# Patient Record
Sex: FEMALE | Employment: UNEMPLOYED | URBAN - METROPOLITAN AREA
[De-identification: names, ages, dates, MRNs, and addresses within clinical notes are randomized per-mention and may not be internally consistent; named-entity substitution may affect disease eponyms.]

---

## 2017-01-20 ENCOUNTER — OFFICE VISIT (OUTPATIENT)
Dept: ORTHOPEDIC SURGERY | Age: 67
End: 2017-01-20

## 2017-01-20 VITALS
SYSTOLIC BLOOD PRESSURE: 170 MMHG | DIASTOLIC BLOOD PRESSURE: 72 MMHG | WEIGHT: 193 LBS | HEIGHT: 62 IN | OXYGEN SATURATION: 96 % | BODY MASS INDEX: 35.51 KG/M2 | RESPIRATION RATE: 18 BRPM | HEART RATE: 73 BPM | TEMPERATURE: 98.1 F

## 2017-01-20 DIAGNOSIS — M48.061 SPINAL STENOSIS OF LUMBAR REGION AT MULTIPLE LEVELS: ICD-10-CM

## 2017-01-20 DIAGNOSIS — Z98.1 S/P LUMBAR FUSION: Primary | ICD-10-CM

## 2017-01-20 RX ORDER — HYDROCODONE BITARTRATE AND ACETAMINOPHEN 7.5; 325 MG/1; MG/1
1 TABLET ORAL
Qty: 90 TAB | Refills: 0 | Status: SHIPPED | OUTPATIENT
Start: 2017-01-20 | End: 2017-04-21 | Stop reason: SDUPTHER

## 2017-01-20 RX ORDER — GABAPENTIN 300 MG/1
600 CAPSULE ORAL
Qty: 60 CAP | Refills: 3 | Status: SHIPPED | OUTPATIENT
Start: 2017-01-20 | End: 2017-04-21 | Stop reason: SDUPTHER

## 2017-01-20 NOTE — MR AVS SNAPSHOT
Visit Information Date & Time Provider Department Dept. Phone Encounter #  
 1/20/2017 10:15 AM Gabby Benitez MD [facility], [address] [facility] [phone] [id number] Follow-up Instructions Return in about 3 months (around 4/20/2017). Follow-up and Disposition History Upcoming Health Maintenance Date Due Hepatitis C Screening 1950 DTaP/Tdap/Td series (1 - Tdap) 8/13/1970 BREAST CANCER SCRN MAMMOGRAM 8/13/1999 FOBT Q 1 YEAR AGE 50-75 8/13/1999 ZOSTER VACCINE AGE 60> 8/13/2009 GLAUCOMA SCREENING Q2Y 8/13/2014 OSTEOPOROSIS SCREENING (DEXA) 8/13/2014 Pneumococcal 65+ Low/Medium Risk (1 of 2 - PCV13) 8/13/2014 MEDICARE YEARLY EXAM 8/13/2014 INFLUENZA AGE 9 TO ADULT 8/1/2016 Allergies as of 1/20/2017  Review Complete On: 1/20/2017 By: Gabby Benitez MD  
  
 Severity Noted Reaction Type Reactions Cortisporin [Neomycin-bacitracin-poly-hc]  01/21/2016    Other (comments)  
 tininitus Morphine  06/16/2016    Other (comments) Neomycin-polymyxin-hc  06/16/2016    Other (comments) Oxycodone-acetaminophen  10/21/2016    Itching Oxycodone-aspirin  06/16/2016    Other (comments) Current Immunizations  Never Reviewed No immunizations on file. Not reviewed this visit You Were Diagnosed With   
  
 Codes Comments S/P lumbar fusion    -  Primary ICD-10-CM: Z98.1 ICD-9-CM: V45.4 Spinal stenosis of lumbar region at multiple levels     ICD-10-CM: M48.06 
ICD-9-CM: 724.02 Vitals BP Pulse Temp Resp Height(growth percentile) Weight(growth percentile) 170/72 73 98.1 °F (36.7 °C) (Oral) 18 5' 2\" (1.575 m) 193 lb (87.5 kg) SpO2 BMI OB Status Smoking Status 96% 35.3 kg/m2 Hysterectomy Former Smoker BMI and BSA Data Body Mass Index Body Surface Area  
 35.3 kg/m 2 1.96 m 2 Preferred Pharmacy Pharmacy Name Phone Lakesha SMITH [facility], [address] [phone] Your Updated Medication List  
  
   
This list is accurate as of: 1/20/17 11:45 AM.  Always use your most recent med list.  
  
  
  
  
 acetaminophen 500 mg tablet Commonly known as:  TYLENOL Take 1,000 mg by mouth three (3) times daily. albuterol 90 mcg/actuation inhaler Commonly known as:  PROVENTIL HFA, VENTOLIN HFA, PROAIR HFA Take 2 Puffs by inhalation every four (4) hours as needed for Wheezing. ALPRAZolam 0.25 mg tablet Commonly known as:  Juvencio Tatum Take 0.5 mg by mouth three (3) times daily (with meals). aspirin delayed-release 81 mg tablet Take  by mouth daily. atorvastatin 40 mg tablet Commonly known as:  LIPITOR Take  by mouth daily. beclomethasone 80 mcg/actuation inhaler Commonly known as:  QVAR Take 1 Puff by inhalation two (2) times a day. CALCIUM 600 WITH VITAMIN D3 PO Take  by mouth three (3) times daily. DULoxetine 30 mg capsule Commonly known as:  CYMBALTA Start with one tablet at bedtime for 1 week Then increase to 2 tablets at bedtime  Indications: CHRONIC MUSCULOSKELETAL PAIN  
  
 esomeprazole 40 mg capsule Commonly known as:  Savanna Bergamo Take 40 mg by mouth daily. gabapentin 300 mg capsule Commonly known as:  NEURONTIN Take 2 Caps by mouth nightly. HYDROcodone-acetaminophen 7.5-325 mg per tablet Commonly known as:  Leidy Fothergill Take 1 Tab by mouth every eight (8) hours as needed for Pain. Max Daily Amount: 3 Tabs. lisinopril-hydroCHLOROthiazide 10-12.5 mg per tablet Commonly known as:  Jed Chalino Take  by mouth daily. meloxicam 15 mg tablet Commonly known as:  MOBIC Take 15 mg by mouth daily. metoprolol tartrate 25 mg tablet Commonly known as:  LOPRESSOR Take 25 mg by mouth two (2) times a day. nitroglycerin 0.4 mg SL tablet Commonly known as:  NITROSTAT by SubLINGual route every five (5) minutes as needed for Chest Pain. omeprazole 20 mg capsule Commonly known as:  PRILOSEC Take 20 mg by mouth daily. oxyCODONE-acetaminophen  mg per tablet Commonly known as:  PERCOCET Take 1 Tab by mouth every six (6) hours as needed for Pain. Max Daily Amount: 4 Tabs. tiotropium 18 mcg inhalation capsule Commonly known as:  Ruffus Cypher Take 1 Cap by inhalation daily. traMADol 50 mg tablet Commonly known as:  ULTRAM  
Take 50 mg by mouth four (4) times daily. Prescriptions Printed Refills HYDROcodone-acetaminophen (NORCO) 7.5-325 mg per tablet 0 Sig: Take 1 Tab by mouth every eight (8) hours as needed for Pain. Max Daily Amount: 3 Tabs. Class: Print Route: Oral  
  
Prescriptions Sent to Pharmacy Refills  
 gabapentin (NEURONTIN) 300 mg capsule 3 Sig: Take 2 Caps by mouth nightly. Class: Normal  
 Pharmacy: [facility]  #: [phone] Route: Oral  
  
We Performed the Following AMB POC XRAY, SPINE, LUMBOSACRAL; 2 O [35373 CPT(R)] Follow-up Instructions Return in about 3 months (around 4/20/2017). Please provide this summary of care documentation to your next provider. Your primary care clinician is listed as Jessica Bauman. If you have any questions after today's visit, please call [phone].

## 2017-01-20 NOTE — PROGRESS NOTES
Uraih Carvalho [facility] [address].  [address], [address]  [place]  Phone: [phone]  Fax: [phone]  PROGRESS NOTE  Patient: Isaías Gómez                MRN: [id number]       SSN: [id number]  YOB: 1950        AGE: 78 y.o. SEX: female  Body mass index is 35.3 kg/(m^2). PCP: Dallas Smith MD  01/20/17    Chief Complaint   Patient presents with    Back Pain     3 month follow up       [facility], RADIOGRAPHS, and PLAN:     HISTORY:  Ms. Jason Pardo returns today. She has made a small amount of progress since she got her bone growth stimulator six weeks ago. Her x-rays appear to be stable. I do not see any haloing today around her instrumentation. She is neurologically intact. She is stable to do activities and feels like she has made a small amount of progress and feels she has improved since surgery, but is not dramatic about the improvements. I see no focal neurology. At this point, I will see her back in eight weeks to monitor her progress. I do not know what else to do for her. Her GI symptoms appear to have subsided. cc: Danny Hernandez M.D. Past Medical History   Diagnosis Date    [facility]     Chronic obstructive pulmonary disease (HCC)     GERD (gastroesophageal reflux disease)     Hypertension     Thyroid condition      enlarged    Tumors      fatty tissue       Family History   Problem Relation Age of Onset    No Known Problems Mother     No Known Problems Father        Current Outpatient Prescriptions   Medication Sig Dispense Refill    esomeprazole (NEXIUM) 40 mg capsule Take 40 mg by mouth daily.  gabapentin (NEURONTIN) 300 mg capsule Start one tablet qhs x 1 week, then may increase to 2 tabs qhs      atorvastatin (LIPITOR) 40 mg tablet Take  by mouth daily.  metoprolol tartrate (LOPRESSOR) 25 mg tablet Take 25 mg by mouth two (2) times a day.       DULoxetine (CYMBALTA) 30 mg capsule Start with one tablet at bedtime for 1 week Then increase to 2 tablets at bedtime  Indications: CHRONIC MUSCULOSKELETAL PAIN 60 Cap 2    meloxicam (MOBIC) 15 mg tablet Take 15 mg by mouth daily.  albuterol (PROVENTIL HFA, VENTOLIN HFA, PROAIR HFA) 90 mcg/actuation inhaler Take 2 Puffs by inhalation every four (4) hours as needed for Wheezing.  beclomethasone (QVAR) 80 mcg/actuation inhaler Take 1 Puff by inhalation two (2) times a day.  nitroglycerin (NITROSTAT) 0.4 mg SL tablet by SubLINGual route every five (5) minutes as needed for Chest Pain.  lisinopril-hydrochlorothiazide (PRINZIDE, ZESTORETIC) 10-12.5 mg per tablet Take  by mouth daily.  ALPRAZolam (XANAX) 0.25 mg tablet Take 0.5 mg by mouth three (3) times daily (with meals).  aspirin delayed-release 81 mg tablet Take  by mouth daily.  CALCIUM CARBONATE/VITAMIN D3 (CALCIUM 600 WITH VITAMIN D3 PO) Take  by mouth three (3) times daily.  acetaminophen (TYLENOL) 500 mg tablet Take 1,000 mg by mouth three (3) times daily.  HYDROcodone-acetaminophen (NORCO) 7.5-325 mg per tablet Take 1 Tab by mouth every eight (8) hours as needed for Pain. Max Daily Amount: 3 Tabs. 90 Tab 0    oxyCODONE-acetaminophen (PERCOCET)  mg per tablet Take 1 Tab by mouth every six (6) hours as needed for Pain. Max Daily Amount: 4 Tabs. 50 Tab 0    tiotropium (SPIRIVA) 18 mcg inhalation capsule Take 1 Cap by inhalation daily.  omeprazole (PRILOSEC) 20 mg capsule Take 20 mg by mouth daily.  traMADol (ULTRAM) 50 mg tablet Take 50 mg by mouth four (4) times daily.          Allergies   Allergen Reactions    Cortisporin [Neomycin-Bacitracin-Poly-Hc] Other (comments)     tininitus    Morphine Other (comments)    Neomycin-Polymyxin-Hc Other (comments)    Oxycodone-Acetaminophen Itching    Oxycodone-Aspirin Other (comments)       Past Surgical History   Procedure Laterality Date    Hx hernia repair hiatal hernia    Hx appendectomy      Hx hysterectomy      Hx breast reduction      Hx mohs procedure Right     Hx tonsil and adenoidectomy      Pr plastic surgery, neck  1990     ACDF by Dr. Mariah Wood Hx cervical fusion  1990     ACDF    Hx cholecystectomy      Hx heent         Past Medical History   Diagnosis Date    [facility]     Chronic obstructive pulmonary disease (HCC)     GERD (gastroesophageal reflux disease)     Hypertension     Thyroid condition      enlarged    Tumors      fatty tissue       Social History     Social History    Marital status:      Spouse name: N/A    Number of children: N/A    Years of education: N/A     Occupational History    retired      Social History Main Topics    Smoking status: Former Smoker    Smokeless tobacco: Never Used      Comment: quit smoking 2000    Alcohol use No    Drug use: No    Sexual activity: Not on file     Other Topics Concern    Not on file     Social History Narrative       REVIEW OF SYSTEMS:   CONSTITUTIONAL SYMPTOMS:  Negative. EYES:  Negative. EARS, NOSE, THROAT AND MOUTH:  Negative. CARDIOVASCULAR:  Negative. RESPIRATORY:  Negative. GENITOURINARY: Negative. GASTROINTESTINAL:  Negative. INTEGUMENTARY (SKIN AND/OR BREAST):  Negative. MUSCULOSKELETAL: Per HPI.   ENDOCRINE/RHEUMATOLOGIC:  Negative. NEUROLOGICAL:  Per HPI. HEMATOLOGIC/LYMPHATIC:  Negative. ALLERGIC/IMMUNOLOGIC:  Negative. PSYCHIATRIC:  Negative. PHYSICAL EXAMINATION:   Visit Vitals    /72    Pulse 73    Temp 98.1 °F (36.7 °C) (Oral)    Resp 18    Ht 5' 2\" (1.575 m)    Wt 193 lb (87.5 kg)    SpO2 96%    BMI 35.3 kg/m2    PAIN SCALE: 7/10    CONSTITUTIONAL: The patient is in no apparent distress and is alert and oriented x 3. HEENT: Normocephalic. Hearing grossly intact. NECK: Supple and symmetric. no tenderness, or masses were felt. RESPIRATORY: No labored breathing.   CARDIOVASCULAR: The carotid pulses were normal. Peripheral pulses were 2+. CHEST: Normal AP diameter and normal contour without any kyphoscoliosis. LYMPHATIC: No lymphadenopathy was appreciated in the neck, axillae or groin. SKIN:  Negative for scars, rashes, lesions, or ulcers on the right upper, right lower, left upper, left lower and trunk. NEUROLOGICAL: Alert and oriented x 3. Ambulation with crutches. FWB. EXTREMITIES: See musculoskeletal.  MUSCULOSKELETAL:   Head and Neck:  Negative for misalignment, asymmetry, crepitation, defects, tenderness masses or effusions.  Left Upper Extremity: Inspection, percussion and palpation preformed. Brumfields sign is negative.  Right Upper Extremity: Inspection, percussion and palpation preformed. Brumfields sign is negative.  Spine, Ribs and Pelvis: Back pain, L>R. Short standing and walking tolerance. Inspection, percussion and palpation preformed. Negative for misalignment, asymmetry, crepitation, defects, tenderness masses or effusions.  Right Lower Extremity: Inspection, percussion and palpation preformed. Negative straight leg raise.  Left Lower Extremity: Inspection, percussion and palpation preformed. Negative straight leg raise. SPINE EXAM:     Lumbar spine: No rash, ecchymosis, or gross obliquity. No focal atrophy is noted. ASSESSMENT    ICD-10-CM ICD-9-CM    1.  S/P lumbar fusion Z98.1 V45.4 AMB POC XRAY, SPINE, LUMBOSACRAL; 2 O       Written by Vera Dillon, as dictated by Amari Lepe MD.

## 2017-04-21 ENCOUNTER — TELEPHONE (OUTPATIENT)
Dept: ORTHOPEDIC SURGERY | Age: 67
End: 2017-04-21

## 2017-04-21 ENCOUNTER — OFFICE VISIT (OUTPATIENT)
Dept: ORTHOPEDIC SURGERY | Age: 67
End: 2017-04-21

## 2017-04-21 VITALS
SYSTOLIC BLOOD PRESSURE: 161 MMHG | BODY MASS INDEX: 34.23 KG/M2 | TEMPERATURE: 97.8 F | HEART RATE: 92 BPM | OXYGEN SATURATION: 96 % | DIASTOLIC BLOOD PRESSURE: 96 MMHG | HEIGHT: 62 IN | RESPIRATION RATE: 18 BRPM | WEIGHT: 186 LBS

## 2017-04-21 DIAGNOSIS — M48.061 LUMBAR SPINAL STENOSIS: Primary | ICD-10-CM

## 2017-04-21 DIAGNOSIS — M48.061 SPINAL STENOSIS OF LUMBAR REGION AT MULTIPLE LEVELS: ICD-10-CM

## 2017-04-21 DIAGNOSIS — Z98.1 S/P LUMBAR FUSION: ICD-10-CM

## 2017-04-21 DIAGNOSIS — M54.2 NECK PAIN: ICD-10-CM

## 2017-04-21 RX ORDER — GABAPENTIN 300 MG/1
300 CAPSULE ORAL 3 TIMES DAILY
Qty: 90 CAP | Refills: 3 | Status: SHIPPED | OUTPATIENT
Start: 2017-04-21

## 2017-04-21 RX ORDER — METHIMAZOLE 5 MG/1
5 TABLET ORAL DAILY
COMMUNITY
Start: 2017-03-22

## 2017-04-21 RX ORDER — HYDROCODONE BITARTRATE AND ACETAMINOPHEN 7.5; 325 MG/1; MG/1
1 TABLET ORAL
Qty: 90 TAB | Refills: 0 | Status: SHIPPED | OUTPATIENT
Start: 2017-04-21

## 2017-04-21 RX ORDER — NAPROXEN 500 MG/1
500 TABLET ORAL 2 TIMES DAILY WITH MEALS
Qty: 60 TAB | Refills: 3 | Status: SHIPPED | OUTPATIENT
Start: 2017-04-21

## 2017-04-21 NOTE — PROGRESS NOTES
Uriah Carvalho [facility] [address].  [address], [address]  [place]  Phone: [phone]  Fax: [phone]  PROGRESS NOTE  Patient: Billie Salas                MRN: [id number]       SSN: [id number]  YOB: 1950        AGE: 78 y.o. SEX: female  Body mass index is 34.02 kg/(m^2). PCP: Jazmin Soto MD  04/21/17    Chief Complaint   Patient presents with    Back Pain     3 month follow up       [facility], RADIOGRAPHS, and PLAN:     HISTORY:  Ms. Koko Juarez returns today. She is having worsening back pain and a new complaint of neck pain with occipital cervical headache. It is mainly a right-side occipital cervical headache. She denies radiculopathy. Probably 20 years ago or nearly so, she had a multilevel cervical fusion. X-rays of her back demonstrate a stable probable pseudarthrosis. She is using a bone growth stimulator. Her pain is worsening and she uses crutches to ambulate. She refuses to consider any type of additional surgical intervention. I sense with a limited fusion we would have to do some further procedure. What that would be is difficult with her known terrible bone stock and smoking history. It would probably be something along the order of an XLIF with revision instrumentation. RADIOGRAPHS:  With regards to her neck, I reviewed x-rays. She has C2 and C3 listhesis with very arthritic facets with a pan cervical fusion below. ASSESSMENT/PLAN:  I am going to get a new MRI of her cervical spine to see if there is any true cord compression or just dealing with local instability. We are going to place her on an enhanced Neurontin dosage at night, as well as nonsteroidal anti-inflammatories to see if this can assist with better pain control. I will see her back in followup.       cc: Jazmin Soto MD         Past Medical History:   Diagnosis Date    [facility] [facility]     Chronic obstructive pulmonary disease ([facility] [facility]     GERD (gastroesophageal reflux disease)     Hypertension     Thyroid condition     enlarged    Tumors     fatty tissue       Family History   Problem Relation Age of Onset    No Known Problems Mother     No Known Problems Father        Current Outpatient Prescriptions   Medication Sig Dispense Refill    methIMAzole (TAPAZOLE) 5 mg tablet Take 5 mg by mouth daily.  HYDROcodone-acetaminophen (NORCO) 7.5-325 mg per tablet Take 1 Tab by mouth every eight (8) hours as needed for Pain. Max Daily Amount: 3 Tabs. 90 Tab 0    gabapentin (NEURONTIN) 300 mg capsule Take 1 Cap by mouth three (3) times daily. 90 Cap 3    naproxen (NAPROSYN) 500 mg tablet Take 1 Tab by mouth two (2) times daily (with meals). 60 Tab 3    atorvastatin (LIPITOR) 40 mg tablet Take 20 mg by mouth daily.  metoprolol tartrate (LOPRESSOR) 25 mg tablet Take 25 mg by mouth two (2) times a day.  meloxicam (MOBIC) 15 mg tablet Take 15 mg by mouth daily.  albuterol (PROVENTIL HFA, VENTOLIN HFA, PROAIR HFA) 90 mcg/actuation inhaler Take 2 Puffs by inhalation every four (4) hours as needed for Wheezing.  tiotropium (SPIRIVA) 18 mcg inhalation capsule Take 1 Cap by inhalation daily.  beclomethasone (QVAR) 80 mcg/actuation inhaler Take 1 Puff by inhalation two (2) times a day.  nitroglycerin (NITROSTAT) 0.4 mg SL tablet by SubLINGual route every five (5) minutes as needed for Chest Pain.  lisinopril-hydrochlorothiazide (PRINZIDE, ZESTORETIC) 10-12.5 mg per tablet Take  by mouth daily.  omeprazole (PRILOSEC) 20 mg capsule Take 40 mg by mouth daily.  ALPRAZolam (XANAX) 0.25 mg tablet Take 0.5 mg by mouth three (3) times daily (with meals).  traMADol (ULTRAM) 50 mg tablet Take 50 mg by mouth three (3) times daily.  aspirin delayed-release 81 mg tablet Take  by mouth daily.  CALCIUM CARBONATE/VITAMIN D3 (CALCIUM 600 WITH VITAMIN D3 PO) Take  by mouth three (3) times daily.  acetaminophen (TYLENOL) 500 mg tablet Take 1,000 mg by mouth three (3) times daily.  esomeprazole (NEXIUM) 40 mg capsule Take 40 mg by mouth daily.  oxyCODONE-acetaminophen (PERCOCET)  mg per tablet Take 1 Tab by mouth every six (6) hours as needed for Pain. Max Daily Amount: 4 Tabs. 50 Tab 0    DULoxetine (CYMBALTA) 30 mg capsule Start with one tablet at bedtime for 1 week Then increase to 2 tablets at bedtime  Indications: CHRONIC MUSCULOSKELETAL PAIN 60 Cap 2       Allergies   Allergen Reactions    Cortisporin [Neomycin-Bacitracin-Poly-Hc] Other (comments)     tininitus    Morphine Other (comments)    Neomycin-Polymyxin-Hc Other (comments)    Oxycodone-Acetaminophen Itching    Oxycodone-Aspirin Other (comments)       Past Surgical History:   Procedure Laterality Date    HX APPENDECTOMY      HX BACK SURGERY  06/01/2016    L4/5 Lami Fusion    HX BREAST REDUCTION      HX CERVICAL FUSION  1990    ACDF    HX CHOLECYSTECTOMY      HX HEENT      HX HERNIA REPAIR      hiatal hernia    HX HYSTERECTOMY      HX MOHS PROCEDURES Right     HX TONSIL AND ADENOIDECTOMY      PLASTIC SURGERY, NECK  1990    ACDF by Dr. Bradford Salazar       Past Medical History:   Diagnosis Date    Anginal pain ([address]     Chronic obstructive pulmonary disease ([address]     GERD (gastroesophageal reflux disease)     Hypertension     Thyroid condition     enlarged    Tumors     fatty tissue       Social History     Social History    Marital status:      Spouse name: N/A    Number of children: N/A    Years of education: N/A     Occupational History    retired      Social History Main Topics    Smoking status: Former Smoker    Smokeless tobacco: Never Used      Comment: quit smoking 2000    Alcohol use No    Drug use: No    Sexual activity: Not on file     Other Topics Concern    Not on file     Social History Narrative       REVIEW OF SYSTEMS:   CONSTITUTIONAL SYMPTOMS:  Negative. EYES:  Negative.    EARS, NOSE, THROAT AND MOUTH:  Negative. CARDIOVASCULAR:  Negative. RESPIRATORY:  Negative. GENITOURINARY: Negative. GASTROINTESTINAL:  Negative. INTEGUMENTARY (SKIN AND/OR BREAST):  Negative. MUSCULOSKELETAL: Per HPI.   ENDOCRINE/RHEUMATOLOGIC:  Negative. NEUROLOGICAL:  Per HPI. HEMATOLOGIC/LYMPHATIC:  Negative. ALLERGIC/IMMUNOLOGIC:  Negative. PSYCHIATRIC:  Negative. PHYSICAL EXAMINATION:   Visit Vitals    BP (!) 161/96    Pulse 92    Temp 97.8 °F (36.6 °C) (Oral)    Resp 18    Ht 5' 2\" (1.575 m)    Wt 186 lb (84.4 kg)    SpO2 96%    BMI 34.02 kg/m2    PAIN SCALE: 7/10    CONSTITUTIONAL: The patient is in no apparent distress and is alert and oriented x 3. HEENT: Normocephalic. Hearing grossly intact. NECK: Supple and symmetric. no tenderness, or masses were felt. RESPIRATORY: No labored breathing. CARDIOVASCULAR: The carotid pulses were normal. Peripheral pulses were 2+. CHEST: Normal AP diameter and normal contour without any kyphoscoliosis. LYMPHATIC: No lymphadenopathy was appreciated in the neck, axillae or groin. SKIN: Negative for scars, rashes, lesions, or ulcers on the right upper, right lower, left upper, left lower and trunk. NEUROLOGICAL: Alert and oriented x 3. Ambulation with crutches. FWB. EXTREMITIES: See musculoskeletal.  MUSCULOSKELETAL:   Head and Neck: Neck pain. Negative for misalignment, asymmetry, crepitation, defects, tenderness masses or effusions.  Left Upper Extremity: Inspection, percussion and palpation preformed. Brumfields sign is negative.  Right Upper Extremity: Inspection, percussion and palpation preformed. Brumfields sign is negative.  Spine, Ribs and Pelvis: Increasing back pain. Short standing and walking tolerance. Inspection, percussion and palpation preformed. Negative for misalignment, asymmetry, crepitation, defects, tenderness masses or effusions.  Left Lower Extremity: Paresthesia.   Inspection, percussion and palpation preformed. Negative straight leg raise.  Right Lower Extremity: Paresthesia. Inspection, percussion and palpation preformed. Negative straight leg raise. SPINE EXAM:     Lumbar spine: No rash, ecchymosis, or gross obliquity. No focal atrophy is noted. ASSESSMENT    ICD-10-CM ICD-9-CM    1. Lumbar spinal stenosis M48.06 724.02 AMB POC XRAY, SPINE, LUMBOSACRAL; 2 O      HYDROcodone-acetaminophen (NORCO) 7.5-325 mg per tablet   2. S/P lumbar fusion Z98.1 V45.4 AMB POC XRAY, SPINE, LUMBOSACRAL; 2 O      HYDROcodone-acetaminophen (NORCO) 7.5-325 mg per tablet      gabapentin (NEURONTIN) 300 mg capsule   3. Neck pain M54.2 723.1 AMB POC XRAY, SPINE, CERVICAL; 4+ VIE      MRI CERV SPINE WO CONT   4. Spinal stenosis of lumbar region at multiple levels M48.06 724.02 gabapentin (NEURONTIN) 300 mg capsule       Written by Faustino Gonzalez, as dictated by Zachariah Cleaning MD.    I, Dr. Zachariah Cleaning MD, confirm that all documentation is accurate.

## 2017-04-21 NOTE — MR AVS SNAPSHOT
Visit Information Date & Time Provider Department Dept. Phone Encounter #  
 4/21/2017 10:45 AM Sweta Lopez MD [address] [place] [address], [address] and [facility] [phone] [id number] Follow-up Instructions Follow-up and Disposition History Upcoming Health Maintenance Date Due Hepatitis C Screening 1950 DTaP/Tdap/Td series (1 - Tdap) 8/13/1970 BREAST CANCER SCRN MAMMOGRAM 8/13/1999 FOBT Q 1 YEAR AGE 50-75 8/13/1999 ZOSTER VACCINE AGE 60> 8/13/2009 GLAUCOMA SCREENING Q2Y 8/13/2014 OSTEOPOROSIS SCREENING (DEXA) 8/13/2014 Pneumococcal 65+ Low/Medium Risk (1 of 2 - PCV13) 8/13/2014 MEDICARE YEARLY EXAM 8/13/2014 INFLUENZA AGE 9 TO ADULT 8/1/2016 Allergies as of 4/21/2017  Review Complete On: 4/21/2017 By: Baron Loredo LPN Severity Noted Reaction Type Reactions Cortisporin [Neomycin-bacitracin-poly-hc]  01/21/2016    Other (comments)  
 tininitus Morphine  06/16/2016    Other (comments) Neomycin-polymyxin-hc  06/16/2016    Other (comments) Oxycodone-acetaminophen  10/21/2016    Itching Oxycodone-aspirin  06/16/2016    Other (comments) Current Immunizations  Never Reviewed No immunizations on file. Not reviewed this visit You Were Diagnosed With   
  
 Codes Comments Lumbar spinal stenosis    -  Primary ICD-10-CM: M48.06 
ICD-9-CM: 724.02 S/P lumbar fusion     ICD-10-CM: Z98.1 ICD-9-CM: V45.4 Neck pain     ICD-10-CM: M54.2 ICD-9-CM: 723.1 Spinal stenosis of lumbar region at multiple levels     ICD-10-CM: M48.06 
ICD-9-CM: 724.02 Vitals BP Pulse Temp Resp Height(growth percentile) Weight(growth percentile) (!) 161/96 92 97.8 °F (36.6 °C) (Oral) 18 5' 2\" (1.575 m) 186 lb (84.4 kg) SpO2 BMI OB Status Smoking Status 96% 34.02 kg/m2 Hysterectomy Former Smoker BMI and BSA Data Body Mass Index Body Surface Area 34.02 kg/m 2 1.92 m 2 Preferred Pharmacy Pharmacy Name Phone [facility], [place] [phone] Your Updated Medication List  
  
   
This list is accurate as of: 4/21/17 11:42 AM.  Always use your most recent med list.  
  
  
  
  
 acetaminophen 500 mg tablet Commonly known as:  TYLENOL Take 1,000 mg by mouth three (3) times daily. albuterol 90 mcg/actuation inhaler Commonly known as:  PROVENTIL HFA, VENTOLIN HFA, PROAIR HFA Take 2 Puffs by inhalation every four (4) hours as needed for Wheezing. ALPRAZolam 0.25 mg tablet Commonly known as:  Toy Moment Take 0.5 mg by mouth three (3) times daily (with meals). aspirin delayed-release 81 mg tablet Take  by mouth daily. atorvastatin 40 mg tablet Commonly known as:  LIPITOR Take 20 mg by mouth daily. beclomethasone 80 mcg/actuation [employer] Commonly known as:  QVAR Take 1 Puff by inhalation two (2) times a day. CALCIUM 600 WITH VITAMIN D3 PO Take  by mouth three (3) times daily. DULoxetine 30 mg capsule Commonly known as:  CYMBALTA Start with one tablet at bedtime for 1 week Then increase to 2 tablets at bedtime  Indications: CHRONIC MUSCULOSKELETAL PAIN  
  
 esomeprazole 40 mg capsule Commonly known as:  Steve Burows Take 40 mg by mouth daily. gabapentin 300 mg capsule Commonly known as:  NEURONTIN Take 1 Cap by mouth three (3) times daily. HYDROcodone-acetaminophen 7.5-325 mg per tablet Commonly known as:  Enedelia Gowers Take 1 Tab by mouth every eight (8) hours as needed for Pain. Max Daily Amount: 3 Tabs. lisinopril-hydroCHLOROthiazide 10-12.5 mg per tablet Commonly known as:  [place] Take  by mouth daily. meloxicam 15 mg tablet Commonly known as:  MOBIC Take 15 mg by mouth daily. methIMAzole 5 mg tablet Commonly known as:  TAPAZOLE Take 5 mg by mouth daily. metoprolol tartrate 25 mg tablet Commonly known as:  LOPRESSOR  
 Take 25 mg by mouth two (2) times a day. naproxen 500 mg tablet Commonly known as:  NAPROSYN Take 1 Tab by mouth two (2) times daily (with meals). nitroglycerin 0.4 mg SL tablet Commonly known as:  NITROSTAT  
by SubLINGual route every five (5) minutes as needed for Chest Pain. omeprazole 20 mg capsule Commonly known as:  PRILOSEC Take 40 mg by mouth daily. oxyCODONE-acetaminophen  mg per tablet Commonly known as:  PERCOCET Take 1 Tab by mouth every six (6) hours as needed for Pain. Max Daily Amount: 4 Tabs. tiotropium 18 mcg inhalation capsule Commonly known as:  [place] Take 1 Cap by inhalation daily. traMADol 50 mg tablet Commonly known as:  ULTRAM  
Take 50 mg by mouth three (3) times daily. Prescriptions Printed Refills HYDROcodone-acetaminophen (NORCO) 7.5-325 mg per tablet 0 Sig: Take 1 Tab by mouth every eight (8) hours as needed for Pain. Max Daily Amount: 3 Tabs. Class: Print Route: Oral  
  
Prescriptions Sent to Pharmacy Refills  
 gabapentin (NEURONTIN) 300 mg capsule 3 Sig: Take 1 Cap by mouth three (3) times daily. Class: Normal  
 Pharmacy: [facility]  Ph #: [phone] Route: Oral  
 naproxen (NAPROSYN) 500 mg tablet 3 Sig: Take 1 Tab by mouth two (2) times daily (with meals). Class: Normal  
 Pharmacy: [facility]  Ph #: [phone] Route: Oral  
  
We Performed the Following AMB POC XRAY, SPINE, CERVICAL; 4+ VIE [27323 CPT(R)] AMB POC XRAY, SPINE, LUMBOSACRAL; 2 O [55464 CPT(R)] To-Do List   
 04/21/2017 Imaging:  MRI CERV SPINE WO CONT Please provide this summary of care documentation to your next provider. Your primary care clinician is listed as Quinn Lu. If you have any questions after today's visit, please call [phone].

## 2017-05-09 ENCOUNTER — HOSPITAL ENCOUNTER (OUTPATIENT)
Age: 67
Discharge: HOME | End: 2017-05-09
Attending: ORTHOPAEDIC SURGERY
Payer: MEDICARE

## 2017-05-09 DIAGNOSIS — M54.2 NECK PAIN: ICD-10-CM

## 2017-05-09 PROCEDURE — 72141 MRI NECK SPINE W/O DYE: CPT

## 2017-05-16 ENCOUNTER — OFFICE VISIT (OUTPATIENT)
Dept: ORTHOPEDIC SURGERY | Age: 67
End: 2017-05-16

## 2017-05-16 VITALS
BODY MASS INDEX: 34.23 KG/M2 | HEIGHT: 62 IN | RESPIRATION RATE: 18 BRPM | HEART RATE: 69 BPM | DIASTOLIC BLOOD PRESSURE: 69 MMHG | TEMPERATURE: 98.4 F | WEIGHT: 186 LBS | SYSTOLIC BLOOD PRESSURE: 137 MMHG

## 2017-05-16 DIAGNOSIS — R51.9 OCCIPITAL HEADACHE: ICD-10-CM

## 2017-05-16 DIAGNOSIS — M48.02 CERVICAL SPINAL STENOSIS: Primary | ICD-10-CM

## 2017-05-16 DIAGNOSIS — M48.061 LUMBAR SPINAL STENOSIS: ICD-10-CM

## 2017-05-16 DIAGNOSIS — Z98.1 S/P LUMBAR FUSION: ICD-10-CM

## 2017-05-16 RX ORDER — HYDROCORTISONE 25 MG/G
CREAM TOPICAL
COMMUNITY
Start: 2017-03-20

## 2017-05-16 NOTE — PROGRESS NOTES
[address], [address]  [place], [address] [place] [address] Street  Phone: [phone]  Fax: [phone]  PROGRESS NOTE  Patient: Estela Dupree                MRN: [id number]       SSN: [id number]  YOB: 1950        AGE: 78 y.o. SEX: female  Body mass index is 34.02 kg/(m^2). PCP: Randy Curry MD  05/16/17    Chief Complaint   Patient presents with    Back Pain     MRI follow up    Neck Pain       HISTORY OF PRESENT ILLNESS, RADIOGRAPHS, and PLAN:     HISTORY:  Ms. Howie Green returns today. She comes in today to discuss the headaches she has begun to get. They are right-sided occipital cervical headaches that get worse with activity and neck motion. She denies radicular issues in her arms. It is primarily neck pain. I have reviewed an MRI of her cervical spine that demonstrates a multilevel cervical fusion at C3-4-5. She has slight listhesis at C2-3 with facet pathology at that level, as well as degenerative changes at C0, C1, and C2. There is no gross instability, but there is a small amount of pannus. My sense is that she is having cervicalgia with occipital cervical headaches emanating from these upper cervical facet arthropathy with junctional levels above her surgery. There is no gross instability or cord compression. I see nothing surgical.      ASSESSMENT/PLAN: I would have her see Dr. Kings Ortiz to see if possibly occipital nerve blocks or upper cervical facet blocks on the right may help her with her pain. Other than that, I do not have much else to offer her. At this point, I will see her back here as-needed for continued monitoring with her lumbar pathology and her neck disease.      cc: Randy Curry MD         Past Medical History:   Diagnosis Date    Anginal pain [facility])     Chronic obstructive pulmonary disease (HCC)     GERD (gastroesophageal reflux disease)     Hypertension     Thyroid condition     enlarged    Tumors fatty tissue       Family History   Problem Relation Age of Onset    No Known Problems Mother     No Known Problems Father        Current Outpatient Prescriptions   Medication Sig Dispense Refill    methIMAzole (TAPAZOLE) 5 mg tablet Take 5 mg by mouth daily.  HYDROcodone-acetaminophen (NORCO) 7.5-325 mg per tablet Take 1 Tab by mouth every eight (8) hours as needed for Pain. Max Daily Amount: 3 Tabs. 90 Tab 0    gabapentin (NEURONTIN) 300 mg capsule Take 1 Cap by mouth three (3) times daily. 90 Cap 3    naproxen (NAPROSYN) 500 mg tablet Take 1 Tab by mouth two (2) times daily (with meals). 60 Tab 3    esomeprazole (NEXIUM) 40 mg capsule Take 40 mg by mouth daily.  atorvastatin (LIPITOR) 40 mg tablet Take 20 mg by mouth daily.  metoprolol tartrate (LOPRESSOR) 25 mg tablet Take 25 mg by mouth two (2) times a day.  DULoxetine (CYMBALTA) 30 mg capsule Start with one tablet at bedtime for 1 week Then increase to 2 tablets at bedtime  Indications: CHRONIC MUSCULOSKELETAL PAIN 60 Cap 2    meloxicam (MOBIC) 15 mg tablet Take 15 mg by mouth daily.  albuterol (PROVENTIL HFA, VENTOLIN HFA, PROAIR HFA) 90 mcg/actuation inhaler Take 2 Puffs by inhalation every four (4) hours as needed for Wheezing.  tiotropium (SPIRIVA) 18 mcg inhalation capsule Take 1 Cap by inhalation daily.  beclomethasone (QVAR) 80 mcg/actuation inhaler Take 1 Puff by inhalation two (2) times a day.  lisinopril-hydrochlorothiazide (PRINZIDE, ZESTORETIC) 10-12.5 mg per tablet Take  by mouth daily.  omeprazole (PRILOSEC) 20 mg capsule Take 40 mg by mouth daily.  ALPRAZolam (XANAX) 0.25 mg tablet Take 0.5 mg by mouth three (3) times daily (with meals).  aspirin delayed-release 81 mg tablet Take  by mouth daily.  CALCIUM CARBONATE/VITAMIN D3 (CALCIUM 600 WITH VITAMIN D3 PO) Take  by mouth three (3) times daily.       acetaminophen (TYLENOL) 500 mg tablet Take 1,000 mg by mouth three (3) times daily.      hydrocortisone (HYTONE) 2.5 % topical cream Apply  to affected area.  oxyCODONE-acetaminophen (PERCOCET)  mg per tablet Take 1 Tab by mouth every six (6) hours as needed for Pain. Max Daily Amount: 4 Tabs. 50 Tab 0    nitroglycerin (NITROSTAT) 0.4 mg SL tablet by SubLINGual route every five (5) minutes as needed for Chest Pain.  traMADol (ULTRAM) 50 mg tablet Take 50 mg by mouth three (3) times daily. Allergies   Allergen Reactions    Cortisporin [Neomycin-Bacitracin-Poly-Hc] Other (comments)     tininitus    Morphine Other (comments)    Neomycin-Polymyxin-Hc Other (comments)    Oxycodone-Acetaminophen Itching    Oxycodone-Aspirin Other (comments)       Past Surgical History:   Procedure Laterality Date    HX APPENDECTOMY      HX BACK SURGERY  06/01/2016    L4/5 Lami Fusion    HX BREAST REDUCTION      HX CERVICAL FUSION  1990    ACDF    HX CHOLECYSTECTOMY      HX HEENT      HX HERNIA REPAIR      hiatal hernia    HX HYSTERECTOMY      HX MOHS PROCEDURES Right     HX TONSIL AND ADENOIDECTOMY      PLASTIC SURGERY, NECK  1990    ACDF by Dr. Ashley Wallace       Past Medical History:   Diagnosis Date    [facility] ([facility] [address]     Chronic obstructive pulmonary disease (HCC)     GERD (gastroesophageal reflux disease)     Hypertension     Thyroid condition     enlarged    Tumors     fatty tissue       Social History     Social History    Marital status:      Spouse name: N/A    Number of children: N/A    Years of education: N/A     Occupational History    retired      Social History Main Topics    Smoking status: Former Smoker    Smokeless tobacco: Never Used      Comment: quit smoking 2000    Alcohol use No    Drug use: No    Sexual activity: Not on file     Other Topics Concern    Not on file     Social History Narrative       REVIEW OF SYSTEMS:   CONSTITUTIONAL SYMPTOMS:  Negative. EYES:  Negative.    EARS, NOSE, THROAT AND MOUTH: Negative. CARDIOVASCULAR:  Negative. RESPIRATORY:  Negative. GENITOURINARY: Negative. GASTROINTESTINAL:  Negative. INTEGUMENTARY (SKIN AND/OR BREAST):  Negative. MUSCULOSKELETAL: Per HPI.   ENDOCRINE/RHEUMATOLOGIC:  Negative. NEUROLOGICAL:  Per HPI. HEMATOLOGIC/LYMPHATIC:  Negative. ALLERGIC/IMMUNOLOGIC:  Negative. PSYCHIATRIC:  Negative. PHYSICAL EXAMINATION:   Visit Vitals    /69    Pulse 69    Temp 98.4 °F (36.9 °C) (Oral)    Resp 18    Ht 5' 2\" (1.575 m)    Wt 186 lb (84.4 kg)    BMI 34.02 kg/m2    PAIN SCALE: 6/10    CONSTITUTIONAL: The patient is in no apparent distress and is alert and oriented x 3. NEUROLOGICAL: Alert and oriented x 3. Ambulation with crutches. FWB. EXTREMITIES: See musculoskeletal.  MUSCULOSKELETAL:   Head and Neck: Neck pain and headaches. Negative for misalignment, asymmetry, crepitation, defects, tenderness masses or effusions.  Left Upper Extremity: Inspection, percussion and palpation preformed. Brumfields sign is negative.  Right Upper Extremity: Inspection, percussion and palpation preformed. Brumfields sign is negative.  Spine, Ribs and Pelvis: Inspection, percussion and palpation preformed. Negative for misalignment, asymmetry, crepitation, defects, tenderness masses or effusions.  Left Lower Extremity: Inspection, percussion and palpation preformed. Negative straight leg raise.  Right Lower Extremity: Inspection, percussion and palpation preformed. Negative straight leg raise. SPINE EXAM:     Cervical spine: Neck is midline. Normal muscle tone. No focal atrophy is noted. ASSESSMENT    ICD-10-CM ICD-9-CM    1. Cervical spinal stenosis M48.02 723.0 REFERRAL TO PAIN MANAGEMENT   2. Lumbar spinal stenosis M48.06 724.02    3. S/P lumbar fusion Z98.1 V45.4    4.  Occipital headache R51 784.0 REFERRAL TO PAIN MANAGEMENT       Written by Radha Villegas, as dictated by Hope Antonio MD.    I, Dr. Hope Antonio MD, confirm that all documentation is accurate.

## 2017-05-16 NOTE — MR AVS SNAPSHOT
Visit Information Date & Time Provider Department Dept. Phone Encounter #  
 5/16/2017  9:50 AM Emil Augustine MD [facility], [address] and Spine Specialists [facility] [phone] [id number] Upcoming Health Maintenance Date Due Hepatitis C Screening 1950 DTaP/Tdap/Td series (1 - Tdap) 8/13/1970 BREAST CANCER SCRN MAMMOGRAM 8/13/1999 FOBT Q 1 YEAR AGE 50-75 8/13/1999 ZOSTER VACCINE AGE 60> 8/13/2009 GLAUCOMA SCREENING Q2Y 8/13/2014 OSTEOPOROSIS SCREENING (DEXA) 8/13/2014 Pneumococcal 65+ Low/Medium Risk (1 of 2 - PCV13) 8/13/2014 MEDICARE YEARLY EXAM 8/13/2014 INFLUENZA AGE 9 TO ADULT 8/1/2017 Allergies as of 5/16/2017  Review Complete On: 5/16/2017 By: Fay Dewitt Severity Noted Reaction Type Reactions Cortisporin [Neomycin-bacitracin-poly-hc]  01/21/2016    Other (comments)  
 tininitus Morphine  06/16/2016    Other (comments) Neomycin-polymyxin-hc  06/16/2016    Other (comments) Oxycodone-acetaminophen  10/21/2016    Itching Oxycodone-aspirin  06/16/2016    Other (comments) Current Immunizations  Never Reviewed No immunizations on file. Not reviewed this visit You Were Diagnosed With   
  
 Codes Comments Cervical spinal stenosis    -  Primary ICD-10-CM: M48.02 
ICD-9-CM: 723.0 Lumbar spinal stenosis     ICD-10-CM: M48.06 
ICD-9-CM: 724.02 S/P lumbar fusion     ICD-10-CM: Z98.1 ICD-9-CM: V45.4 Occipital headache     ICD-10-CM: R51 ICD-9-CM: 496. 0 Vitals BP Pulse Temp Resp Height(growth percentile) Weight(growth percentile)  
 137/69 69 98.4 °F (36.9 °C) (Oral) 18 5' 2\" (1.575 m) 186 lb (84.4 kg) BMI OB Status Smoking Status 34.02 kg/m2 Hysterectomy Former Smoker BMI and BSA Data Body Mass Index Body Surface Area 34.02 kg/m 2 1.92 m 2 Preferred Pharmacy Pharmacy Name Phone [facility] [place] [address] [phone] Your Updated Medication List  
  
   
This list is accurate as of: 5/16/17 10:27 AM.  Always use your most recent med list.  
  
  
  
  
 acetaminophen 500 mg tablet Commonly known as:  TYLENOL Take 1,000 mg by mouth three (3) times daily. albuterol 90 mcg/actuation inhaler Commonly known as:  PROVENTIL HFA, VENTOLIN HFA, PROAIR HFA Take 2 Puffs by inhalation every four (4) hours as needed for Wheezing. ALPRAZolam 0.25 mg tablet Commonly known as:  Norlene Grice Take 0.5 mg by mouth three (3) times daily (with meals). aspirin delayed-release 81 mg tablet Take  by mouth daily. atorvastatin 40 mg tablet Commonly known as:  LIPITOR Take 20 mg by mouth daily. beclomethasone 80 mcg/actuation [employer] Commonly known as:  QVAR Take 1 Puff by inhalation two (2) times a day. CALCIUM 600 WITH VITAMIN D3 PO Take  by mouth three (3) times daily. DULoxetine 30 mg capsule Commonly known as:  CYMBALTA Start with one tablet at bedtime for 1 week Then increase to 2 tablets at bedtime  Indications: CHRONIC MUSCULOSKELETAL PAIN  
  
 esomeprazole 40 mg capsule Commonly known as:  Pati Chris Take 40 mg by mouth daily. gabapentin 300 mg capsule Commonly known as:  NEURONTIN Take 1 Cap by mouth three (3) times daily. HYDROcodone-acetaminophen 7.5-325 mg per tablet Commonly known as:  Karole Dakins Take 1 Tab by mouth every eight (8) hours as needed for Pain. Max Daily Amount: 3 Tabs. hydrocortisone 2.5 % topical cream  
Commonly known as:  HYTONE Apply  to affected area. lisinopril-hydroCHLOROthiazide 10-12.5 mg per tablet Commonly known as:  Michael Phoebe Take  by mouth daily. meloxicam 15 mg tablet Commonly known as:  MOBIC Take 15 mg by mouth daily. methIMAzole 5 mg tablet Commonly known as:  TAPAZOLE Take 5 mg by mouth daily. metoprolol tartrate 25 mg tablet Commonly known as:  LOPRESSOR  
 Take 25 mg by mouth two (2) times a day. naproxen 500 mg tablet Commonly known as:  NAPROSYN Take 1 Tab by mouth two (2) times daily (with meals). nitroglycerin 0.4 mg SL tablet Commonly known as:  NITROSTAT  
by SubLINGual route every five (5) minutes as needed for Chest Pain. omeprazole 20 mg capsule Commonly known as:  PRILOSEC Take 40 mg by mouth daily. oxyCODONE-acetaminophen  mg per tablet Commonly known as:  PERCOCET Take 1 Tab by mouth every six (6) hours as needed for Pain. Max Daily Amount: 4 Tabs. tiotropium 18 mcg inhalation capsule Commonly known as:  Melvena Sitter Take 1 Cap by inhalation daily. traMADol 50 mg tablet Commonly known as:  ULTRAM  
Take 50 mg by mouth three (3) times daily. We Performed the Following REFERRAL TO PAIN MANAGEMENT [TMX846 Custom] Comments: For right Facet blocks at C2-3 and RT occipital nerve block Referral Information Referral ID Referred By Referred To  
  
 [id number] Coby Mcallister MD   
   [address] [place] [address] [facility] [address] Phone: [phone] Fax: [phone] Visits Status Start Date End Date 1 New Request 5/16/17 5/16/18 If your referral has a status of pending review or denied, additional information will be sent to support the outcome of this decision. Please provide this summary of care documentation to your next provider. Your primary care clinician is listed as Aureliano Mendenhall. If you have any questions after today's visit, please call [phone].

## 2017-06-08 ENCOUNTER — OFFICE VISIT (OUTPATIENT)
Dept: PAIN MANAGEMENT | Age: 67
End: 2017-06-08

## 2017-06-08 VITALS
DIASTOLIC BLOOD PRESSURE: 83 MMHG | BODY MASS INDEX: 34.04 KG/M2 | HEART RATE: 66 BPM | HEIGHT: 62 IN | WEIGHT: 185 LBS | SYSTOLIC BLOOD PRESSURE: 160 MMHG

## 2017-06-08 DIAGNOSIS — M54.81 OCCIPITAL NEURALGIA OF RIGHT SIDE: ICD-10-CM

## 2017-06-08 DIAGNOSIS — M47.812 FACET ARTHROPATHY, CERVICAL: ICD-10-CM

## 2017-06-08 DIAGNOSIS — G44.86 CERVICOGENIC HEADACHE: ICD-10-CM

## 2017-06-08 DIAGNOSIS — M47.812 SPONDYLOSIS OF CERVICAL REGION WITHOUT MYELOPATHY OR RADICULOPATHY: Primary | ICD-10-CM

## 2017-06-08 DIAGNOSIS — G89.4 CHRONIC PAIN SYNDROME: ICD-10-CM

## 2017-06-08 NOTE — PROGRESS NOTES
[address] [facility]  Interventional Pain Management Consultation History & Physical    PATIENT NAME:  Alon Lopez     YOB: 1950    DATE OF SERVICE:   6/8/2017      CHIEF COMPLAINT:  Neck Pain      REASON FOR VISIT:   Alon Lopez presents to the pain clinic today for initial evaluation and to consider interventional pain management options as indicated for the type and location of the pain the patient is presenting with. HISTORY OF PRESENT ILLNESS: Patient presents for initial evaluation and consideration for interventional procedures as indicated. She is referred to us by Dr. Arjun Mcgrath for cervical procedures right-sided. We are asked to consider right-sided facet procedures and right sided occipital nerve block per      Patient relates that sometime ago she had low back surgery with Dr. Arjun Mcgrath. She states that in January of this year she was she felt like she slept wrong on her neck. She awoke with right-sided neck pain radiating up into her right scalp. This has persisted since January 2017. She endorses hurting aching throbbing pain. She has difficulty turning her head to the right, but also to the left. She has difficulty looking both up and down to the pain. Denies upper extremity symptoms or radiating arm pain. She takes both opioid as well as non-opioid pain management medications, does not feel like these are helping with her neck pain and radiating right scalp pain. Has not tried physical therapy for this. She has not had previous injections for this. She has had previous cervical spine surgery by Dr. Arjun Mcgrath. She takes baby aspirin 81 mg. She also takes naproxen 500 mg tablet, 1 tablet twice a day. By review of available medical records, progress note dated May 16, 2017 by Dr. Arjun Mcgrath is reviewed. Patient has history of headaches as well as right-sided neck pain.   Right-sided occipital cervical headaches are noted. She denies radicular issues in her arms. She has primarily neck pain. MRI of cervical spine demonstrates fusion at C3-4-5 levels. She has slight listhesis at C2-3 with facet pathology at this level. Overall impression of cervicalgia with occipital cervical headaches noted. Cervical facet arthropathy at the upper junctional levels above her surgery is suspected as likely cause of her occipital headaches. She is subsequently referred for right sided occipital nerve block and right-sided cervical facet blocks. History of angina, COPD, reflux, hypertension, thyroid condition. Anterior cervical disc fusion. We reviewed her cervical MRI dated May 9, 2017. This is significant for vertebral body fusion C3-C5. C2-3 posterior disc bulge AP canal diameter 10 mm no canal stenosis noted. Facet hypertrophy is noted moderately severe left-sided and moderate right-sided foraminal stenosis. Mild foraminal narrowing C3-4 bilaterally. Mild posterior bulge posterior cortex no cord compression noted C4-5. AP canal diameter 10.8 mm. Bilateral foraminal narrowing with facet hypertrophy noted same level. Posterior disc bulge C5-6. Ligamentum flavum thickening with AP canal diameter 9.8 mm. Borderline mild canal stenosis. Moderately severe bilateral foraminal stenosis with facet hypertrophy C5-6. Posterior disc bulges C6-7 and C7-T1. ASSESSMENT/OPTIONS: as follows. We discussed options. I believe the patient is having signs and symptoms, as well as exam and imaging evidence suggestive of right sided occipital neuralgia as well as right-sided cervicogenic pain. I am in agreement with Dr. Mondragon and that I believe patient will benefit from right-sided occipital nerve block as well as right sided facet injections at C2-3, C3-4, C4-5 levels with IV conscious sedation. We will do a series of 3 of these with IV conscious sedation,  by approximately 2 weeks apart.   I will have the patient hold her aspirin and nonsteroidals for approximately 1 week prior to these procedures. I have discussed the risks and benefits, indications, contraindications, and side effects of intended procedure with the patient. I have used skeleton spine model to describe and discuss the procedure with the patient. I have answered all questions relating to the procedure. Patient understands the nature of the procedure and wishes to proceed. Patient has no further questions. MRI Results (most recent):    Results from [place] encounter on 05/09/17   MRI CERV SPINE WO CONT   Narrative Sagittal and axial multisequence MR images of cervical spine were obtained. Vertebral body fusion from C3 to C5. Anterior plate and screws identified. There  is straightening of the cervical curvature with trace anterior spondylolisthesis  of C2 , C5, C6 and C7. No acute compression fracture or pathologic marrow  signal. No paraspinous soft tissue abnormalities. Craniocervical junction is  normal. Spinal cord shows normal signal intensity and morphology. Mild cystic  changes posterior to the odontoid. C2-C3: Posterior disc bulge. No cord contact or compression. AP canal measures  10 mm, no central stenosis. With facet hypertrophy, moderately severe left and  moderate right foraminal stenosis. C3-C4: No disc material. No central stenosis or cord compression. Mild bilateral  foraminal narrowing. C4-C5: No disc material. Mild posterior bulging of the posterior cortex,  indenting spinal cord with no cord compression. AP canal measures 10.8 mm. Mild  bilateral foraminal narrowing with facet hypertrophy. C5-C6: Mild posterior disc bulge. Ligamentum flavum thickening present. AP canal  measures 9.8 mm, borderline mild central stenosis. No cord compression or edema. Moderately severe bilateral foraminal stenosis with facet hypertrophy. C6-C7: Mild posterior disc bulge with no central stenosis or cord contact. No  significant foraminal stenosis. C7-T1: Mild posterior disc bulge with no central stenosis. Mild bilateral  foraminal narrowing. Impression IMPRESSION: Mainly foraminal narrowing with facet hypertrophy. No significant  central stenosis or cord compression. PAST MEDICAL HISTORY:   The patient  has a past medical history of Anginal pain ([address] Chronic obstructive pulmonary disease (HCC); GERD (gastroesophageal reflux disease); Hypertension; Thyroid condition; and Tumors. PAST SURGICAL HISTORY:   The patient  has a past surgical history that includes hernia repair; appendectomy; hysterectomy; breast reduction; mohs procedure (Right); tonsil and adenoidectomy; plastic surgery, neck (1990); cholecystectomy; heent; cervical fusion (1990); and back surgery (06/01/2016). CURRENT MEDICATIONS:   The patient has a current medication list which includes the following prescription(s): hydrocortisone, methimazole, hydrocodone-acetaminophen, gabapentin, naproxen, esomeprazole, atorvastatin, metoprolol tartrate, duloxetine, meloxicam, albuterol, tiotropium, beclomethasone, nitroglycerin, lisinopril-hydrochlorothiazide, omeprazole, alprazolam, tramadol, aspirin delayed-release, calcium carbonate/vitamin d3, acetaminophen, and oxycodone-acetaminophen. ALLERGIES:     Allergies   Allergen Reactions    Cortisporin [Neomycin-Bacitracin-Poly-Hc] Other (comments)     tininitus    Morphine Other (comments)    Neomycin-Polymyxin-Hc Other (comments)    Oxycodone-Acetaminophen Itching    Oxycodone-Aspirin Other (comments)       FAMILY HISTORY:   The patient family history includes No Known Problems in her father and mother. SOCIAL HISTORY:   The patient  reports that she has quit smoking. She has never used smokeless tobacco. The patient  reports that she does not drink alcohol. She also  reports that she does not use illicit drugs.     REVIEW OF SYSTEMS:    The patient denies fever, chills, weight loss (Constitutional), rash, itching (Skin), tinnitus, congestion (HENT), blurred vision, photophobia (Eyes), palpitations, orthopnea (Cardiovascular), hemoptysis, wheezing (Respiratory), nausea, vomiting, diarrhea (Gastrointestinal), dysuria, hematuria, urgency (Genitourinary), bowel or bladder incontinence, loss of consciousness (Neurologic), suicidal or homicidal ideation or hallucinations (Psychiatric). Denies swelling, axillary or groin masses (Lymphatic). PHYSICAL EXAM:  VS:   Visit Vitals    /83    Pulse 66    Ht 5' 2\" (1.575 m)    Wt 83.9 kg (185 lb)    BMI 33.84 kg/m2     General: Well-developed and well-nourished. Body habitus consistent with recorded height and weight and the calculated BMI. Apparent distress due to right-sided neck and occipital pain. Head: Normocephalic, atraumatic. Skin: Inspection of the skin reveals no rashes, lesions or infection. CV: Regular rate. No murmurs or rubs noted. No peripheral edema noted. Pulm: Respirations are even and unlabored. Extr: No clubbing, cyanosis, or edema noted. Musculoskeletal:  1. Cervical spine -decreased range of motion especially to the right . Paraspinous tenderness right-sided occipital and cervical levels. There is no scoliosis, asymmetry, or musculoskeletal defect. 2. Thoracic spine - Full ROM. No paraspinous tenderness at any level. There is no scoliosis, asymmetry, or musculoskeletal defect. 3. Lumbar spine - Full ROM. No paraspinous tenderness at any level. SI joints are nontender bilaterally. There is no scoliosis, asymmetry, or musculoskeletal defect. 4. Right upper extremity - Full ROM. 5/5 muscle strength in all muscle groups. No pain or tenderness in shoulder, elbow, wrist, or hand. 5. Left upper extremity - Full ROM. 5/5 muscle strength in all muscle groups. No pain or tenderness in shoulder, elbow, wrist, or hand. 6. Right lower extremity - Full ROM. 5/5 muscle strength in all muscle groups.  No pain, tenderness, or swelling in the hip, knee, ankle or foot. 7. Left lower extremity - Full ROM. 5/5 muscle strength in all muscle groups. No pain, tenderness, or swelling in the hip, knee, ankle or foot. Neurological:  1. Mental Status - Alert, awake and oriented. Speech is clear and appropriate. 2. Cranial Nerves - Extraocular muscles intact bilaterally. Cranial nerves II-XII grossly intact bilaterally. 3. Gait - Non-antalgic   4. Reflexes - 2+ and symmetric throughout. 5. Sensation - Intact to light touch and pin prick. 6. Provocative Tests - Spurlings negative bilaterally. Psychological:  1. Mood and affect - Appropriate. 2. Speech - Appropriate. 3. Though content - Appropriate. 4. Judgment - Appropriate. ASSESSMENT:      ICD-10-CM ICD-9-CM    1. Spondylosis of cervical region without myelopathy or radiculopathy M47.812 721.0    2. Cervicogenic headache R51 784.0    3. Occipital neuralgia of right side M54.81 723.8    4. Facet arthropathy, cervical (HCC) M12.88 721.0    5. Chronic pain syndrome G89.4 338.4            PLAN:    1.    I have thoroughly discussed the risks and benefits, indications, contraindications, and side effects of any and procedures that were mentioned at today's patient visit. I have used a skeleton model to explain all procedures, as well as to provide added emphasis regarding procedures and as well for patient education purposes. I have answered all questions in great detail, and I have obtained verbal confirmation for all procedures planned with the patient. 3.    I have reviewed in great detail today the patient's MRI and other imaging studies with the patient. I have explained to the patient their condition using both actual recent and relevant images insofar as I am able to obtain actual images. I have used a skeleton model for added emphasis as well as patient education.       4.    I have advised patient to have a primary care provider continue to care for their health maintenance and general medical conditions. 5,    I have placed appropriate referrals to specialty care providers as I have deemed necessary through today's clinical consultation with the patient. 5.    I have explained to the patient that if any significant side effects, issues, problems, concerns, or perceived complications may have arisen at around the time of the patient's procedures, they should either call the pain management clinic or go to the emergency room immediately for medical provider evaluation. 6.   I have encouraged all patients to call the pain management clinic with any questions or concerns that they may have pertaining to their procedures. DISPOSITION:   The patients condition and plan were discussed at length and all questions were answered. The patient agrees with the plan. A total of 45 minutes was spent with the patient of which over half of the time was spent counseling the patient. Angela Hunter MD 6/9/2017 9:51 AM    Note: Although these clinic notes were documented by the provider at the time of the exam, they have not been proofed and are subject to transcription variance.

## 2017-06-16 RX ORDER — MIDAZOLAM HYDROCHLORIDE 1 MG/ML
.5-6 INJECTION, SOLUTION INTRAMUSCULAR; INTRAVENOUS
Status: CANCELLED | OUTPATIENT
Start: 2017-06-19

## 2017-06-16 RX ORDER — SODIUM CHLORIDE 0.9 % (FLUSH) 0.9 %
5-10 SYRINGE (ML) INJECTION AS NEEDED
Status: CANCELLED | OUTPATIENT
Start: 2017-06-19

## 2017-06-19 ENCOUNTER — HOSPITAL ENCOUNTER (OUTPATIENT)
Age: 67
Setting detail: OUTPATIENT SURGERY
Discharge: HOME | End: 2017-06-19
Attending: PHYSICAL MEDICINE & REHABILITATION | Admitting: PHYSICAL MEDICINE & REHABILITATION
Payer: MEDICARE

## 2017-06-19 VITALS
TEMPERATURE: 98.2 F | SYSTOLIC BLOOD PRESSURE: 149 MMHG | OXYGEN SATURATION: 95 % | HEART RATE: 82 BPM | DIASTOLIC BLOOD PRESSURE: 81 MMHG | RESPIRATION RATE: 16 BRPM

## 2017-06-19 PROCEDURE — 74011250636 HC RX REV CODE- 250/636

## 2017-06-19 PROCEDURE — 76010000009 HC PAIN MGT 0 TO 30 MIN PROC: Performed by: PHYSICAL MEDICINE & REHABILITATION

## 2017-06-19 PROCEDURE — 74011000250 HC RX REV CODE- 250

## 2017-06-19 RX ORDER — FENTANYL CITRATE 50 UG/ML
INJECTION, SOLUTION INTRAMUSCULAR; INTRAVENOUS AS NEEDED
Status: DISCONTINUED | OUTPATIENT
Start: 2017-06-19 | End: 2017-06-22 | Stop reason: HOSPADM

## 2017-06-19 RX ORDER — BETAMETHASONE SODIUM PHOSPHATE AND BETAMETHASONE ACETATE 3; 3 MG/ML; MG/ML
INJECTION, SUSPENSION INTRA-ARTICULAR; INTRALESIONAL; INTRAMUSCULAR; SOFT TISSUE AS NEEDED
Status: DISCONTINUED | OUTPATIENT
Start: 2017-06-19 | End: 2017-06-22 | Stop reason: HOSPADM

## 2017-06-19 RX ORDER — SODIUM CHLORIDE 0.9 % (FLUSH) 0.9 %
5-10 SYRINGE (ML) INJECTION AS NEEDED
Status: DISCONTINUED | OUTPATIENT
Start: 2017-06-19 | End: 2017-06-22 | Stop reason: HOSPADM

## 2017-06-19 RX ORDER — LIDOCAINE HYDROCHLORIDE 10 MG/ML
INJECTION, SOLUTION EPIDURAL; INFILTRATION; INTRACAUDAL; PERINEURAL AS NEEDED
Status: DISCONTINUED | OUTPATIENT
Start: 2017-06-19 | End: 2017-06-22 | Stop reason: HOSPADM

## 2017-06-19 RX ORDER — MIDAZOLAM HYDROCHLORIDE 1 MG/ML
.5-6 INJECTION, SOLUTION INTRAMUSCULAR; INTRAVENOUS
Status: DISCONTINUED | OUTPATIENT
Start: 2017-06-19 | End: 2017-06-22 | Stop reason: HOSPADM

## 2017-06-19 NOTE — INTERVAL H&P NOTE
H&P Update:  Joanne Horowitz was seen and examined. History and physical has been reviewed. The patient has been examined.  There have been no significant clinical changes since the completion of the originally dated History and Physical.    Signed By: Jewel Otoole MD     June 19, 2017 8:45 AM

## 2017-06-19 NOTE — PERIOP NOTES
Patient ambulated to car with crutches. Wheelchair offered. Wheelchair declined. Patient armband removed and given to patient to take home.   Patient was informed of the privacy risks if armband lost or stolen

## 2017-06-19 NOTE — PROCEDURES
[facility] [address] [facility]NAG[facility]    OCCIPITAL NERVE BLOCK PROCEDURE REPORT      PATIENT:  Afia Leslie  YOB: 1950  DATE OF SERVICE:  6/19/2017  SITE:  [facility]    PRE-PROCEDURE DIAGNOSIS:  See Above    POST-PROCEDURE DIAGNOSIS:  See Above                PROCEDURE:    1. Right greater occipital nerve block  (59321,  )  2. Right lesser occipital nerve block    (93453,  )     ANESTHESIA:  Local with IV moderate sedation. See Medication Administration Record for specific medications and dosage. COMPLICATIONS: None. PHYSICIAN:  Sunday Naik MD    PRE-PROCEDURE NOTE:  Pre-procedural assessment of the patient was performed including a limited history and physical examination. The details of the procedure were discussed with the patient, including the risks, benefits and alternative options and an informed consent was obtained. The patients NPO status, if necessary for the specific procedure and/or administration of moderate intravenous sedation, if utilized, and availability of a responsible adult to escort the patient following the procedure were confirmed. PROCEDURE NOTE:  The patient was brought to the procedure suite and positioned on the fluoroscopy table in the sitting position. Physiologic monitors were applied and supplemental oxygen was administered via nasal cannula. The skin was prepped in the standard surgical fashion and sterile drapes were applied over the procedure site. Please refer to the Flowsheet for documentation of the patients vital signs and the Medication Administration Record for any oral and/or intravenous sedation administered prior to or during the procedure. The occiput was palpated at midline.  At the level of the superior nuchal ridge, the occipital artery was palpated and the area of maximal tenderness medial to the artery was identified, corresponding to the greater occipital nerve. A small volume of 1% lidocaine was injected superficially for local anesthesia. A 25 gauge regional block needle was advanced toward the point of maximal tenderness from a lateral skin entry site. After negative aspiration, a mixture of Betamethasone 1ml (6mg/ml) admixed with lidocaine 1% was infiltrated in a slight fanwise distribution along the course of the greater occipital nerve. The same process was repeated for the ipsilaterally located lesser occipital nerve. The needle was then cleared of steroid solution and removed. The area was thoroughly cleaned and sterile bandages applied as necessary. The patient tolerated the procedure well and vital signs remained stable throughout the procedure. POST-PROCEDURE COURSE:   The patient was escorted from the procedure suite in satisfactory condition and recovered per facility protocol based on the type of procedure performed and/or the sedation utilized. The patient did not experience any adverse events and remained hemodynamically stable during the post-procedure period. DISCHARGE NOTE:  Upon discharge, the patient was able to tolerate fluids and was in no acute distress. The patient was oriented to person, place and time and vital signs were stable. Appropriate post-procedure instructions were provided and explained to the patient in detail and all questions were answered.     Sunday Naik MD 6/19/2017 11:28 AM

## 2017-06-26 ENCOUNTER — TELEPHONE (OUTPATIENT)
Dept: PAIN MANAGEMENT | Age: 67
End: 2017-06-26

## 2017-07-07 NOTE — TELEPHONE ENCOUNTER
.. Ms. Carrie Ricketts was contacted for follow-up status post RT Occiptal Nerve Blocks on June 19, 2017.  She reports:    Pre-procedure numerical pain score: 8/10  Post-procedure numerical pain score one week after: 5/10  Duration of relief post-procedure (if applicable): 1 weeks  Improvement in functional activities (if applicable): Yes  Percentage of overall improvement: 75%    COMMENTS:  Patient states she was able to go to the supermarket, cook, and perform other household chores without difficulty, she states she could feel that her muscles seemed to be less tense and is looking forward to next series of injections to eliminate her headaches and neck pains as with this pain, she is simply aggravated with performing daily functions and is hopeful for this relief she has experienced to become her new normal.

## 2017-07-13 RX ORDER — SODIUM CHLORIDE 0.9 % (FLUSH) 0.9 %
5-10 SYRINGE (ML) INJECTION AS NEEDED
Status: CANCELLED | OUTPATIENT
Start: 2017-07-13

## 2017-07-13 RX ORDER — MIDAZOLAM HYDROCHLORIDE 1 MG/ML
.5-6 INJECTION, SOLUTION INTRAMUSCULAR; INTRAVENOUS
Status: CANCELLED | OUTPATIENT
Start: 2017-07-17

## 2017-07-17 ENCOUNTER — APPOINTMENT (OUTPATIENT)
Dept: GENERAL RADIOLOGY | Age: 67
End: 2017-07-17
Attending: PHYSICAL MEDICINE & REHABILITATION
Payer: MEDICARE

## 2017-07-17 ENCOUNTER — HOSPITAL ENCOUNTER (OUTPATIENT)
Age: 67
Setting detail: OUTPATIENT SURGERY
Discharge: HOME | End: 2017-07-17
Attending: PHYSICAL MEDICINE & REHABILITATION | Admitting: PHYSICAL MEDICINE & REHABILITATION
Payer: MEDICARE

## 2017-07-17 VITALS
BODY MASS INDEX: 32.2 KG/M2 | WEIGHT: 175 LBS | DIASTOLIC BLOOD PRESSURE: 84 MMHG | TEMPERATURE: 98.3 F | HEART RATE: 70 BPM | RESPIRATION RATE: 15 BRPM | SYSTOLIC BLOOD PRESSURE: 151 MMHG | OXYGEN SATURATION: 95 % | HEIGHT: 62 IN

## 2017-07-17 PROCEDURE — 74011000250 HC RX REV CODE- 250

## 2017-07-17 PROCEDURE — 74011250636 HC RX REV CODE- 250/636

## 2017-07-17 PROCEDURE — 77030003666 HC NDL SPINAL BD -A: Performed by: PHYSICAL MEDICINE & REHABILITATION

## 2017-07-17 PROCEDURE — 76010000009 HC PAIN MGT 0 TO 30 MIN PROC: Performed by: PHYSICAL MEDICINE & REHABILITATION

## 2017-07-17 RX ORDER — FENTANYL CITRATE 50 UG/ML
INJECTION, SOLUTION INTRAMUSCULAR; INTRAVENOUS AS NEEDED
Status: DISCONTINUED | OUTPATIENT
Start: 2017-07-17 | End: 2017-07-17 | Stop reason: HOSPADM

## 2017-07-17 RX ORDER — MIDAZOLAM HYDROCHLORIDE 1 MG/ML
.5-6 INJECTION, SOLUTION INTRAMUSCULAR; INTRAVENOUS
Status: DISCONTINUED | OUTPATIENT
Start: 2017-07-17 | End: 2017-07-17 | Stop reason: HOSPADM

## 2017-07-17 RX ORDER — SODIUM CHLORIDE 0.9 % (FLUSH) 0.9 %
5-10 SYRINGE (ML) INJECTION AS NEEDED
Status: DISCONTINUED | OUTPATIENT
Start: 2017-07-17 | End: 2017-07-17 | Stop reason: HOSPADM

## 2017-07-17 RX ORDER — DEXAMETHASONE SODIUM PHOSPHATE 100 MG/10ML
INJECTION INTRAMUSCULAR; INTRAVENOUS
Status: DISCONTINUED | OUTPATIENT
Start: 1840-12-31 | End: 2017-07-17 | Stop reason: HOSPADM

## 2017-07-17 RX ORDER — LIDOCAINE HYDROCHLORIDE 10 MG/ML
INJECTION, SOLUTION EPIDURAL; INFILTRATION; INTRACAUDAL; PERINEURAL AS NEEDED
Status: DISCONTINUED | OUTPATIENT
Start: 2017-07-17 | End: 2017-07-17 | Stop reason: HOSPADM

## 2017-07-17 NOTE — H&P
17 July 2017, 9:42AM. Patient seen and examined prior to procedure. Chart and data reviewed. No significant interval changes from previous evaluation noted. Stable for procedure as intended and discussed.  [facility]

## 2017-07-17 NOTE — DISCHARGE INSTRUCTIONS
[facility] for Pain Management      Post Procedures Instructions    *Resume Diet and Activity as tolerated. Rest for the remainder of the day. *You may fell worse before you feel better as the numbing medications wear off before the steroids take effect if used for your procedures. *Do not use affected extremity until numbness or loss of sensation has completely resolved without assistance. *DO NOT DRIVE, operate machinery/heavey equipment for 24 hours. *DO NOT DRINK ALCOHOL for 24 hours as it may interact with the sedation if you received it and also thins your blood and may cause you to bleed. *WAIT 24 hours before starting back ANY Blood thinning medications:   (Heparin, Coumadin, Warfarin, Lovenox, Plavix, Aggrenox)    *Resume Pre-Procedure Medications as prescribed except Blood Thinners unless directed by your Physician or Cardiologist.     *Avoid Hot tubs and Heating pad for 24 hours to prevent dissipation of medications, you may shower to remove bandages and remaining prep residue on the skin. * If you develop a Headache, drink plenty of fluids including beverages with caffeine (Coffee, Mt. Dew etc.) and rest.  If the headache persists longer than 24 hoursor intensifies - Please call [facility]) [phone]    * If you are DIABETIC, check your blood sugar three times a day for the next three days, the steroids will increase your blood sugar. If your blood sugar is greater than 400 have someone drive you to the nearest [address]. * If you experience any of the following problems, call the [facility] [phone] between 8:00 am - 4:30pm or After Hours [phone].     Shortness of breath    Fever of 101 F or higher    Nausea / Vomiting (not normal to you)    Increasing stiffness in the neck    Weakness or numbness in the arms or legs that is not resolving    Prolonged and increasing pain > than 4 days    ANYTHING OUT of the ORDINARY TO YOU    If YOU are experiencing a severe reaction / complication that you have never had before post procedure, call 911 or go to the nearest emergency room! All patients must have a  for transportation [place] regardless if you do or do not receive sedation. DISCHARGE SUMMARY from Nurse      PATIENT INSTRUCTIONS:    After Oral  or intravenous sedation, for 24 hours or while taking prescription Narcotics:  · Limit your activities  · Do not drive and operate hazardous machinery  · Do not make important personal or business decisions  · Do  not drink alcoholic beverages  · If you have not urinated within 8 hours after discharge, please contact your surgeon on call. Report the following to your surgeon:  · Excessive pain, swelling, redness or odor of or around the surgical area  · Temperature over 101  · Nausea and vomiting lasting longer than 4 hours or if unable to take medications  · Any signs of decreased circulation or nerve impairment to extremity: change in color, persistent  numbness, tingling, coldness or increase pain  · Any questions        What to do at Home:  Recommended activity: Activity as tolerated, NO DRIVING FOR 24 Hours post injection          *  Please give a list of your current medications to your Primary Care Provider. *  Please update this list whenever your medications are discontinued, doses are      changed, or new medications (including over-the-counter products) are added. *  Please carry medication information at all times in case of emergency situations. These are general instructions for a healthy lifestyle:    No smoking/ No tobacco products/ Avoid exposure to second hand smoke    Surgeon General's Warning:  Quitting smoking now greatly reduces serious risk to your health.     Obesity, smoking, and sedentary lifestyle greatly increases your risk for illness    A healthy diet, regular physical exercise & weight monitoring are important for maintaining a healthy lifestyle    You may be retaining fluid if you have a history of heart failure or if you experience any of the following symptoms:  Weight gain of 3 pounds or more overnight or 5 pounds in a week, increased swelling in our hands or feet or shortness of breath while lying flat in bed. Please call your doctor as soon as you notice any of these symptoms; do not wait until your next office visit. Recognize signs and symptoms of STROKE:    F-face looks uneven    A-arms unable to move or move unevenly    S-speech slurred or non-existent    T-time-call 911 as soon as signs and symptoms begin-DO NOT go       Back to bed or wait to see if you get better-TIME IS BRAIN.

## 2017-07-17 NOTE — PROCEDURES
[facility]    THERAPEUTIC CERVICAL INTRA ARTICULAR FACET JOINT   PROCEDURE REPORT      PATIENT:  Emery Ward  YOB: 1950  DATE OF SERVICE:  7/17/2017  [facility]    PRE-PROCEDURE DIAGNOSIS:  See Above    POST-PROCEDURE DIAGNOSIS:  See Above                PROCEDURE:  1. Right therapeutic cervical intra-articular facet joint (08143, [id number], [id number],  )  2. Fluoroscopic needle guidance (23840)  3. Supervision of moderate sedation (75287)    LEVELS TREATED:  Right C2-3, C3-4, C4-5,  levels    ANESTHESIA:  Local with moderate IV sedation. See Medication Administration Record for specific medications and dosage. COMPLICATIONS: None. PHYSICIAN:  Nakia Chavez MD    PRE-PROCEDURE NOTE:  Pre-procedural assessment of the patient was performed including a limited history and physical examination. The details of the procedure were discussed with the patient, including the risks, benefits and alternative options and an informed consent was obtained. The patients NPO status, if necessary for the specific procedure and/or administration of moderate intravenous sedation, if utilized, and availability of a responsible adult to escort the patient following the procedure were confirmed. A peripheral intravenous cannula was placed without difficulty and lactated Ringers solution administered. See nursing notes for details. PROCEDURE NOTE:  The patient was brought to the procedure suite and positioned on the fluoroscopy table in the prone position position with the operative side facing up. Physiologic monitors were applied and supplemental oxygen was administered via nasal cannula. The skin was prepped in the standard surgical fashion and sterile drapes were applied over the procedure site.  Please refer to the Flowsheet for documentation of the patients vital signs and the Medication Administration Report for any oral and/or intravenous sedation administered prior to or during the procedure. 1% Lidocaine was utilized for local anesthesia. Under AP fluoroscopic guidance with caudal tilt of the fluoroscope intensifier screen, the cervical facet joints were identified. A 25-gauge, 2-1/2 inch short bevel spinal needle was advanced directly into the facet joint at each of the above-listed levels. Correct needle placement was confirmed by injecting 0.5 mL of a non-ionic, water-soluble contrast medium (Isovue-M 200) following negative aspiration of blood, air or CSF into each facet joint revealing an appropriate cervical facet joint arthrogram.       Following this, a 0.5 to 1 mL aliquot of a solution comprised of equal volumes of dexamethasone (10mg/ml) and lidocaine 1% was injected into each facet joint after the negative aspiration of blood, air or CSF. The stylets were replaced and the needles were removed. The area was thoroughly cleaned and sterile bandages applied as necessary. The patient tolerated the procedure well and vital signs remained stable throughout the procedure. POST-PROCEDURE COURSE:   The patient was escorted from the procedure suite in satisfactory condition and recovered per facility protocol based on the type of procedure performed and/or the sedation utilized. The patient did not experience any adverse events and remained hemodynamically stable during the post-procedure period. DISCHARGE NOTE:  Upon discharge, the patient was able to tolerate fluids and was in no acute distress. The patient was oriented to person, place and time and vital signs were stable. Appropriate post-procedure instructions were provided and explained to the patient in detail and all questions were answered.     Amalia Paniagua MD 7/17/2017 2:26 PM

## 2017-07-27 RX ORDER — SODIUM CHLORIDE 0.9 % (FLUSH) 0.9 %
5-10 SYRINGE (ML) INJECTION AS NEEDED
Status: CANCELLED | OUTPATIENT
Start: 2017-07-31

## 2017-07-31 ENCOUNTER — HOSPITAL ENCOUNTER (OUTPATIENT)
Age: 67
Setting detail: OUTPATIENT SURGERY
Discharge: HOME | End: 2017-07-31
Attending: PHYSICAL MEDICINE & REHABILITATION | Admitting: PHYSICAL MEDICINE & REHABILITATION
Payer: MEDICARE

## 2017-07-31 VITALS
HEIGHT: 62 IN | BODY MASS INDEX: 32.2 KG/M2 | OXYGEN SATURATION: 97 % | DIASTOLIC BLOOD PRESSURE: 88 MMHG | HEART RATE: 77 BPM | RESPIRATION RATE: 16 BRPM | TEMPERATURE: 98.3 F | SYSTOLIC BLOOD PRESSURE: 140 MMHG | WEIGHT: 175 LBS

## 2017-07-31 PROCEDURE — 74011000250 HC RX REV CODE- 250

## 2017-07-31 PROCEDURE — 74011250636 HC RX REV CODE- 250/636

## 2017-07-31 PROCEDURE — 76010000009 HC PAIN MGT 0 TO 30 MIN PROC: Performed by: PHYSICAL MEDICINE & REHABILITATION

## 2017-07-31 RX ORDER — FENTANYL CITRATE 50 UG/ML
INJECTION, SOLUTION INTRAMUSCULAR; INTRAVENOUS AS NEEDED
Status: DISCONTINUED | OUTPATIENT
Start: 2017-07-31 | End: 2017-07-31 | Stop reason: HOSPADM

## 2017-07-31 RX ORDER — SODIUM CHLORIDE 0.9 % (FLUSH) 0.9 %
5-10 SYRINGE (ML) INJECTION AS NEEDED
Status: DISCONTINUED | OUTPATIENT
Start: 2017-07-31 | End: 2017-07-31 | Stop reason: HOSPADM

## 2017-07-31 RX ORDER — BETAMETHASONE SODIUM PHOSPHATE AND BETAMETHASONE ACETATE 3; 3 MG/ML; MG/ML
INJECTION, SUSPENSION INTRA-ARTICULAR; INTRALESIONAL; INTRAMUSCULAR; SOFT TISSUE AS NEEDED
Status: DISCONTINUED | OUTPATIENT
Start: 2017-07-31 | End: 2017-07-31 | Stop reason: HOSPADM

## 2017-07-31 RX ORDER — MIDAZOLAM HYDROCHLORIDE 1 MG/ML
INJECTION, SOLUTION INTRAMUSCULAR; INTRAVENOUS AS NEEDED
Status: DISCONTINUED | OUTPATIENT
Start: 2017-07-31 | End: 2017-07-31 | Stop reason: HOSPADM

## 2017-07-31 RX ORDER — LIDOCAINE HYDROCHLORIDE 10 MG/ML
INJECTION, SOLUTION EPIDURAL; INFILTRATION; INTRACAUDAL; PERINEURAL AS NEEDED
Status: DISCONTINUED | OUTPATIENT
Start: 2017-07-31 | End: 2017-07-31 | Stop reason: HOSPADM

## 2017-07-31 NOTE — H&P (VIEW-ONLY)
17 July 2017, 9:42AM. Patient seen and examined prior to procedure. Chart and data reviewed. No significant interval changes from previous evaluation noted. Stable for procedure as intended and discussed.  [facility]

## 2017-07-31 NOTE — INTERVAL H&P NOTE
H&P Update:  Kamari Andre was seen and examined. History and physical has been reviewed. The patient has been examined.  There have been no significant clinical changes since the completion of the originally dated History and Physical.    Signed By: Mina Roman MD     July 31, 2017 9:32 AM

## 2017-07-31 NOTE — PROCEDURES
[facility]    OCCIPITAL NERVE BLOCK PROCEDURE REPORT      PATIENT:  Therese Keys  YOB: 1950  DATE OF SERVICE:  7/31/2017  SITE:  [facility]    PRE-PROCEDURE DIAGNOSIS:  See Above    POST-PROCEDURE DIAGNOSIS:  See Above                PROCEDURE:    1. Right greater occipital nerve block  (67630,  )  2. Right lesser occipital nerve block    (50791,  )         3. Local with IV moderate sedation (66564)    ANESTHESIA:   See Medication Administration Record for specific medications and dosage. COMPLICATIONS: None. PHYSICIAN:  Chelsea Jackson MD    PRE-PROCEDURE NOTE:  Pre-procedural assessment of the patient was performed including a limited history and physical examination. The details of the procedure were discussed with the patient, including the risks, benefits and alternative options and an informed consent was obtained. The patients NPO status, if necessary for the specific procedure and/or administration of moderate intravenous sedation, if utilized, and availability of a responsible adult to escort the patient following the procedure were confirmed. PROCEDURE NOTE:  The patient was brought to the procedure suite and positioned on the fluoroscopy table in the sitting position. Physiologic monitors were applied and supplemental oxygen was administered via nasal cannula. The skin was prepped in the standard surgical fashion and sterile drapes were applied over the procedure site. Please refer to the Flowsheet for documentation of the patients vital signs and the Medication Administration Record for any oral and/or intravenous sedation administered prior to or during the procedure. The occiput was palpated at midline.  At the level of the superior nuchal ridge, the occipital artery was palpated and the area of maximal tenderness medial to the artery was identified, corresponding to the greater occipital nerve. A small volume of 1% lidocaine was injected superficially for local anesthesia. A 25 gauge regional block needle was advanced toward the point of maximal tenderness from a lateral skin entry site. After negative aspiration, a mixture of Betamethasone 2ml (6mg/ml) admixed with lidocaine 1% was infiltrated in a slight fanwise distribution along the course of the greater occipital nerve. The same process was repeated for the ipsilaterally located lesser occipital nerve. The needle was then cleared of steroid solution and removed. The area was thoroughly cleaned and sterile bandages applied as necessary. The patient tolerated the procedure well and vital signs remained stable throughout the procedure. POST-PROCEDURE COURSE:   The patient was escorted from the procedure suite in satisfactory condition and recovered per facility protocol based on the type of procedure performed and/or the sedation utilized. The patient did not experience any adverse events and remained hemodynamically stable during the post-procedure period. DISCHARGE NOTE:  Upon discharge, the patient was able to tolerate fluids and was in no acute distress. The patient was oriented to person, place and time and vital signs were stable. Appropriate post-procedure instructions were provided and explained to the patient in detail and all questions were answered.     Jeff Mccarty MD 7/31/2017 1:37 PM

## 2017-07-31 NOTE — DISCHARGE INSTRUCTIONS
[facility]      Post Procedures Instructions    *Resume Diet and Activity as tolerated. Rest for the remainder of the day. *You may fell worse before you feel better as the numbing medications wear off before the steroids take effect if used for your procedures. *Do not use affected extremity until numbness or loss of sensation has completely resolved without assistance. *DO NOT DRIVE, operate machinery/heavey equipment for 24 hours. *DO NOT DRINK ALCOHOL for 24 hours as it may interact with the sedation if you received it and also thins your blood and may cause you to bleed. *WAIT 24 hours before starting back ANY Blood thinning medications:   (Heparin, Coumadin, Warfarin, Lovenox, Plavix, Aggrenox)    *Resume Pre-Procedure Medications as prescribed except Blood Thinners unless directed by your Physician or Cardiologist.     *Avoid Hot tubs and Heating pad for 24 hours to prevent dissipation of medications, you may shower to remove bandages and remaining prep residue on the skin. * If you develop a Headache, drink plenty of fluids including beverages with caffeine (Coffee, Mt. Dew etc.) and rest.  If the headache persists longer than 24 hoursor intensifies - Please call [facility]) [phone]      * If you are DIABETIC, check your blood sugar three times a day for the next three days, the steroids will increase your blood sugar. If your blood sugar is greater than 400 have someone drive you to the nearest [address]. * If you experience any of the following problems, call the [facility] [phone] between 8:00 am - 4:30pm or After Hours [phone].     Shortness of breath    Fever of 101 F or higher    Nausea / Vomiting (not normal to you)    Increasing stiffness in the neck    Weakness or numbness in the arms or legs that is not resolving    Prolonged and increasing pain > than 4 days    ANYTHING OUT of the ORDINARY TO YOU    If YOU are experiencing a severe reaction / complication that you have never had before post procedure, call 911 or go to the nearest emergency room! All patients must have a  for transportation [place] regardless if you do or do not receive sedation. DISCHARGE SUMMARY from Nurse      PATIENT INSTRUCTIONS:    After Oral  or intravenous sedation, for 24 hours or while taking prescription Narcotics:  · Limit your activities  · Do not drive and operate hazardous machinery  · Do not make important personal or business decisions  · Do  not drink alcoholic beverages  · If you have not urinated within 8 hours after discharge, please contact your surgeon on call. Report the following to your surgeon:  · Excessive pain, swelling, redness or odor of or around the surgical area  · Temperature over 101  · Nausea and vomiting lasting longer than 4 hours or if unable to take medications  · Any signs of decreased circulation or nerve impairment to extremity: change in color, persistent  numbness, tingling, coldness or increase pain  · Any questions        What to do at Home:  Recommended activity: Activity as tolerated, NO DRIVING FOR 24 Hours post injection          *  Please give a list of your current medications to your Primary Care Provider. *  Please update this list whenever your medications are discontinued, doses are      changed, or new medications (including over-the-counter products) are added. *  Please carry medication information at all times in case of emergency situations. These are general instructions for a healthy lifestyle:    No smoking/ No tobacco products/ Avoid exposure to second hand smoke    Surgeon General's Warning:  Quitting smoking now greatly reduces serious risk to your health.     Obesity, smoking, and sedentary lifestyle greatly increases your risk for illness    A healthy diet, regular physical exercise & weight monitoring are important for maintaining a healthy lifestyle    You may be retaining fluid if you have a history of heart failure or if you experience any of the following symptoms:  Weight gain of 3 pounds or more overnight or 5 pounds in a week, increased swelling in our hands or feet or shortness of breath while lying flat in bed. Please call your doctor as soon as you notice any of these symptoms; do not wait until your next office visit. Recognize signs and symptoms of STROKE:    F-face looks uneven    A-arms unable to move or move unevenly    S-speech slurred or non-existent    T-time-call 911 as soon as signs and symptoms begin-DO NOT go       Back to bed or wait to see if you get better-TIME IS BRAIN. [employer] Activation    Thank you for requesting access to [employer]. Please follow the instructions below to securely access and download your online medical record. [employer] allows you to send messages to your doctor, view your test results, renew your prescriptions, schedule appointments, and more. How Do I Sign Up? 1. In your internet browser, go to [email or web address]  2. Click on the First Time User? Click Here link in the Sign In box. You will be redirect to the New Member Sign Up page. 3. Enter your [employer] Access Code exactly as it appears below. You will not need to use this code after youve completed the sign-up process. If you do not sign up before the expiration date, you must request a new code. [employer] Access Code: [email or web address]  Expires: 2017  8:20 PM (This is the date your [employer] access code will )    4. Enter the last four digits of your Social Security Number (xxxx) and Date of Birth (mm/dd/yyyy) as indicated and click Submit. You will be taken to the next sign-up page. 5. Create a [employer] ID. This will be your [employer] login ID and cannot be changed, so think of one that is secure and easy to remember. 6. Create a [employer] password.  You can change your password at any time. 7. Enter your Password Reset Question and Answer. This can be used at a later time if you forget your password. 8. Enter your e-mail address. You will receive e-mail notification when new information is available in [address] 9. Click Sign Up. You can now view and download portions of your medical record. 10. Click the Download Summary menu link to download a portable copy of your medical information. Additional Information    If you have questions, please visit the Frequently Asked Questions section of the [employer] website at [email or web address] Remember, [employer] is NOT to be used for urgent needs. For medical emergencies, dial 911.

## 2017-08-11 RX ORDER — SODIUM CHLORIDE 0.9 % (FLUSH) 0.9 %
5-10 SYRINGE (ML) INJECTION AS NEEDED
Status: CANCELLED | OUTPATIENT
Start: 2017-08-22

## 2017-08-11 RX ORDER — MIDAZOLAM HYDROCHLORIDE 1 MG/ML
.5-6 INJECTION, SOLUTION INTRAMUSCULAR; INTRAVENOUS
Status: CANCELLED | OUTPATIENT
Start: 2017-08-22

## 2017-08-22 ENCOUNTER — APPOINTMENT (OUTPATIENT)
Dept: GENERAL RADIOLOGY | Age: 67
End: 2017-08-22
Attending: PHYSICAL MEDICINE & REHABILITATION
Payer: MEDICARE

## 2017-08-22 ENCOUNTER — HOSPITAL ENCOUNTER (OUTPATIENT)
Age: 67
Setting detail: OUTPATIENT SURGERY
Discharge: HOME | End: 2017-08-22
Attending: PHYSICAL MEDICINE & REHABILITATION | Admitting: PHYSICAL MEDICINE & REHABILITATION
Payer: MEDICARE

## 2017-08-22 VITALS
HEIGHT: 60 IN | RESPIRATION RATE: 16 BRPM | SYSTOLIC BLOOD PRESSURE: 155 MMHG | WEIGHT: 169 LBS | OXYGEN SATURATION: 96 % | TEMPERATURE: 98.6 F | HEART RATE: 84 BPM | DIASTOLIC BLOOD PRESSURE: 86 MMHG | BODY MASS INDEX: 33.18 KG/M2

## 2017-08-22 PROCEDURE — 77030003666 HC NDL SPINAL BD -A: Performed by: PHYSICAL MEDICINE & REHABILITATION

## 2017-08-22 PROCEDURE — 74011000250 HC RX REV CODE- 250

## 2017-08-22 PROCEDURE — 76010000009 HC PAIN MGT 0 TO 30 MIN PROC: Performed by: PHYSICAL MEDICINE & REHABILITATION

## 2017-08-22 PROCEDURE — 77030003672 HC NDL SPN HALY -A: Performed by: PHYSICAL MEDICINE & REHABILITATION

## 2017-08-22 PROCEDURE — 74011250636 HC RX REV CODE- 250/636

## 2017-08-22 RX ORDER — SODIUM CHLORIDE 0.9 % (FLUSH) 0.9 %
5-10 SYRINGE (ML) INJECTION AS NEEDED
Status: DISCONTINUED | OUTPATIENT
Start: 2017-08-22 | End: 2017-08-22 | Stop reason: HOSPADM

## 2017-08-22 RX ORDER — DEXAMETHASONE SODIUM PHOSPHATE 100 MG/10ML
INJECTION INTRAMUSCULAR; INTRAVENOUS AS NEEDED
Status: DISCONTINUED | OUTPATIENT
Start: 2017-08-22 | End: 2017-08-22 | Stop reason: HOSPADM

## 2017-08-22 RX ORDER — MIDAZOLAM HYDROCHLORIDE 1 MG/ML
INJECTION, SOLUTION INTRAMUSCULAR; INTRAVENOUS AS NEEDED
Status: DISCONTINUED | OUTPATIENT
Start: 2017-08-22 | End: 2017-08-22 | Stop reason: HOSPADM

## 2017-08-22 RX ORDER — LIDOCAINE HYDROCHLORIDE 10 MG/ML
INJECTION, SOLUTION EPIDURAL; INFILTRATION; INTRACAUDAL; PERINEURAL AS NEEDED
Status: DISCONTINUED | OUTPATIENT
Start: 2017-08-22 | End: 2017-08-22 | Stop reason: HOSPADM

## 2017-08-22 RX ORDER — FENTANYL CITRATE 50 UG/ML
INJECTION, SOLUTION INTRAMUSCULAR; INTRAVENOUS AS NEEDED
Status: DISCONTINUED | OUTPATIENT
Start: 2017-08-22 | End: 2017-08-22 | Stop reason: HOSPADM

## 2017-08-22 RX ORDER — MIDAZOLAM HYDROCHLORIDE 1 MG/ML
.5-6 INJECTION, SOLUTION INTRAMUSCULAR; INTRAVENOUS
Status: DISCONTINUED | OUTPATIENT
Start: 2017-08-22 | End: 2017-08-22 | Stop reason: HOSPADM

## 2017-08-22 NOTE — PROCEDURES
[facility]    THERAPEUTIC CERVICAL INTRA ARTICULAR FACET JOINT   PROCEDURE REPORT      PATIENT:  Anne Matthews  YOB: 1950  DATE OF SERVICE:  8/22/2017  [facility]    PRE-PROCEDURE DIAGNOSIS:  See Above    POST-PROCEDURE DIAGNOSIS:  See Above                PROCEDURE:  1. Right therapeutic cervical intra-articular facet joint (68849,  [id number],  [id number], )  2. Fluoroscopic needle guidance (91595)  3. Supervision of moderate sedation (38264)    LEVELS TREATED:  Right C2-3, C3-4, C4-5,  levels    ANESTHESIA:  Local with moderate IV sedation. See Medication Administration Record for specific medications and dosage. COMPLICATIONS: None. PHYSICIAN:  Wally Sterling MD    PRE-PROCEDURE NOTE:  Pre-procedural assessment of the patient was performed including a limited history and physical examination. The details of the procedure were discussed with the patient, including the risks, benefits and alternative options and an informed consent was obtained. The patients NPO status, if necessary for the specific procedure and/or administration of moderate intravenous sedation, if utilized, and availability of a responsible adult to escort the patient following the procedure were confirmed. A peripheral intravenous cannula was placed without difficulty and lactated Ringers solution administered. See nursing notes for details. PROCEDURE NOTE:  The patient was brought to the procedure suite and positioned on the fluoroscopy table in the prone position position with the operative side facing up. Physiologic monitors were applied and supplemental oxygen was administered via nasal cannula. The skin was prepped in the standard surgical fashion and sterile drapes were applied over the procedure site.  Please refer to the Flowsheet for documentation of the patients vital signs and the Medication Administration Report for any oral and/or intravenous sedation administered prior to or during the procedure. 1% Lidocaine was utilized for local anesthesia. Under AP fluoroscopic guidance with caudal tilt of the fluoroscope intensifier screen, the cervical facet joints were identified. A 25-gauge, 2-1/2 inch short bevel spinal needle was advanced directly into the facet joint at each of the above-listed levels. Correct needle placement was confirmed by injecting 0.5 mL of a non-ionic, water-soluble contrast medium (Isovue-M 200) following negative aspiration of blood, air or CSF into each facet joint revealing an appropriate cervical facet joint arthrogram.       Following this, a 0.5 to 1 mL aliquot of a solution comprised of equal volumes of dexamethasone (10mg/ml) and lidocaine 1% was injected into each facet joint after the negative aspiration of blood, air or CSF. The stylets were replaced and the needles were removed. The area was thoroughly cleaned and sterile bandages applied as necessary. The patient tolerated the procedure well and vital signs remained stable throughout the procedure. POST-PROCEDURE COURSE:   The patient was escorted from the procedure suite in satisfactory condition and recovered per facility protocol based on the type of procedure performed and/or the sedation utilized. The patient did not experience any adverse events and remained hemodynamically stable during the post-procedure period. DISCHARGE NOTE:  Upon discharge, the patient was able to tolerate fluids and was in no acute distress. The patient was oriented to person, place and time and vital signs were stable. Appropriate post-procedure instructions were provided and explained to the patient in detail and all questions were answered.     Danielito Jordan MD 8/22/2017 12:09 PM

## 2017-08-22 NOTE — DISCHARGE INSTRUCTIONS
[facility]      Post Procedures Instructions    *Resume Diet and Activity as tolerated. Rest for the remainder of the day. *You may fell worse before you feel better as the numbing medications wear off before the steroids take effect if used for your procedures. *Do not use affected extremity until numbness or loss of sensation has completely resolved without assistance. *DO NOT DRIVE, operate machinery/heavey equipment for 24 hours. *DO NOT DRINK ALCOHOL for 24 hours as it may interact with the sedation if you received it and also thins your blood and may cause you to bleed. *WAIT 24 hours before starting back ANY Blood thinning medications:   (Heparin, Coumadin, Warfarin, Lovenox, Plavix, Aggrenox)    *Resume Pre-Procedure Medications as prescribed except Blood Thinners unless directed by your Physician or Cardiologist.     *Avoid Hot tubs and Heating pad for 24 hours to prevent dissipation of medications, you may shower to remove bandages and remaining prep residue on the skin. * If you develop a Headache, drink plenty of fluids including beverages with caffeine (Coffee, Mt. Dew etc.) and rest.  If the headache persists longer than 24 hoursor intensifies - Please call [facility] [phone]    * If you are DIABETIC, check your blood sugar three times a day for the next three days, the steroids will increase your blood sugar. If your blood sugar is greater than 400 have someone drive you to the nearest [address]. * If you experience any of the following problems, call the [facility] [phone] between 8:00 am - 4:30pm or After Hours [phone].     Shortness of breath    Fever of 101 F or higher    Nausea / Vomiting (not normal to you)    Increasing stiffness in the neck    Weakness or numbness in the arms or legs that is not resolving    Prolonged and increasing pain > than 4 days    ANYTHING OUT of the ORDINARY TO YOU    If YOU are experiencing a severe reaction / complication that you have never had before post procedure, call 911 or go to the nearest emergency room! All patients must have a  for transportation [place] regardless if you do or do not receive sedation. DISCHARGE SUMMARY from Nurse      PATIENT INSTRUCTIONS:    After Oral  or intravenous sedation, for 24 hours or while taking prescription Narcotics:  · Limit your activities  · Do not drive and operate hazardous machinery  · Do not make important personal or business decisions  · Do  not drink alcoholic beverages  · If you have not urinated within 8 hours after discharge, please contact your surgeon on call. Report the following to your surgeon:  · Excessive pain, swelling, redness or odor of or around the surgical area  · Temperature over 101  · Nausea and vomiting lasting longer than 4 hours or if unable to take medications  · Any signs of decreased circulation or nerve impairment to extremity: change in color, persistent  numbness, tingling, coldness or increase pain  · Any questions        What to do at Home:  Recommended activity: Activity as tolerated, NO DRIVING FOR 24 Hours post injection          *  Please give a list of your current medications to your Primary Care Provider. *  Please update this list whenever your medications are discontinued, doses are      changed, or new medications (including over-the-counter products) are added. *  Please carry medication information at all times in case of emergency situations. These are general instructions for a healthy lifestyle:    No smoking/ No tobacco products/ Avoid exposure to second hand smoke    Surgeon General's Warning:  Quitting smoking now greatly reduces serious risk to your health.     Obesity, smoking, and sedentary lifestyle greatly increases your risk for illness    A healthy diet, regular physical exercise & weight monitoring are important for maintaining a healthy lifestyle    You may be retaining fluid if you have a history of heart failure or if you experience any of the following symptoms:  Weight gain of 3 pounds or more overnight or 5 pounds in a week, increased swelling in our hands or feet or shortness of breath while lying flat in bed. Please call your doctor as soon as you notice any of these symptoms; do not wait until your next office visit. Recognize signs and symptoms of STROKE:    F-face looks uneven    A-arms unable to move or move unevenly    S-speech slurred or non-existent    T-time-call 911 as soon as signs and symptoms begin-DO NOT go       Back to bed or wait to see if you get better-TIME IS BRAIN.

## 2017-08-22 NOTE — H&P
22 Aug 2017, 9:30AM.   Patient seen and examined prior to procedure. Chart and data reviewed. No significant interval changes from previous evaluation noted. Stable for procedure as intended and discussed.  [facility]

## 2020-09-08 ENCOUNTER — OFFICE VISIT (OUTPATIENT)
Dept: ORTHOPEDIC SURGERY | Age: 70
End: 2020-09-08
Payer: MEDICARE

## 2020-09-08 VITALS — WEIGHT: 175 LBS | BODY MASS INDEX: 34.36 KG/M2 | HEIGHT: 60 IN | TEMPERATURE: 98.7 F

## 2020-09-08 DIAGNOSIS — M17.11 PRIMARY OSTEOARTHRITIS OF RIGHT KNEE: ICD-10-CM

## 2020-09-08 DIAGNOSIS — M25.561 ACUTE PAIN OF RIGHT KNEE: ICD-10-CM

## 2020-09-08 DIAGNOSIS — M25.561 RIGHT KNEE PAIN, UNSPECIFIED CHRONICITY: Primary | ICD-10-CM

## 2020-09-08 PROCEDURE — 99203 OFFICE O/P NEW LOW 30 MIN: CPT | Performed by: ORTHOPAEDIC SURGERY

## 2020-09-08 PROCEDURE — 20611 DRAIN/INJ JOINT/BURSA W/US: CPT | Performed by: ORTHOPAEDIC SURGERY

## 2020-09-08 PROCEDURE — 73562 X-RAY EXAM OF KNEE 3: CPT | Performed by: ORTHOPAEDIC SURGERY

## 2020-09-08 RX ORDER — LIDOCAINE HYDROCHLORIDE 10 MG/ML
9 INJECTION INFILTRATION; PERINEURAL ONCE
Status: COMPLETED | OUTPATIENT
Start: 2020-09-08 | End: 2020-09-08

## 2020-09-08 RX ORDER — TRIAMCINOLONE ACETONIDE 40 MG/ML
40 INJECTION, SUSPENSION INTRA-ARTICULAR; INTRAMUSCULAR ONCE
Status: COMPLETED | OUTPATIENT
Start: 2020-09-08 | End: 2020-09-08

## 2020-09-08 RX ADMIN — LIDOCAINE HYDROCHLORIDE 9 ML: 10 INJECTION INFILTRATION; PERINEURAL at 09:35

## 2020-09-08 RX ADMIN — TRIAMCINOLONE ACETONIDE 40 MG: 40 INJECTION, SUSPENSION INTRA-ARTICULAR; INTRAMUSCULAR at 09:35

## 2020-09-08 NOTE — LETTER
[facility] 1950  
[id number]  
 
 
9/8/2020 I hereby authorize and direct Jarrod Gomez MD, Abhilash Steven, and whomever he may designate as his associate to perform upon myself the following procedure: 
 
Injection of: Kenalog, Supartz, Euflexxa, Orthovisc in the Right/Left ____________________. If any unforeseen condition arises in the course of the procedure, I further authorize him and his associated and/or assistant(s) to do whatever he/she deems advisable. The nature, purpose, benefits, risks, side effects, likelihood of achieving goals, and potential problems that might occur during recuperation, risks for not receiving the proposed care, treatment and services and alternatives of the procedure have been fully explained to me by my physician including, but not limited to: 
 
Swelling, joint pain, skin pigment changes, worsening of condition, and failure to improve. I acknowledge that no guarantee or assurance has been made to me as to the results that may be obtained or the likelihood of success. _______________________________________ Signature of patient or authorized representative [employer] [facility] fax: [phone]

## 2020-09-08 NOTE — PROGRESS NOTES
Providers protocol for the intake nurse to complete in patient's chart:    Lynne Rojas presents today for   Chief Complaint   Patient presents with    Shoulder Pain     bilateral, C/O shoulder,hip,thigh,knee,neck,back       Reason for visit - from intake sheet  Pain assessment  -  from intake sheet  Height - from intake sheet  Weight - from intake sheet  Medical Conditions - from intake sheet  Family medical history - from intake sheet  Social History, alcohol, smoking - from the intake sheet  The PHQ2 only questions - from the intake sheet  Learning Assessment - from the intake sheet    [facility]  Travel Screening done by [facility]    Provider will complete patient's chart

## 2020-09-08 NOTE — PATIENT INSTRUCTIONS
Knee Pain or Injury: Care Instructions  Your Care Instructions     Injuries are a common cause of knee problems. Sudden (acute) injuries may be caused by a direct blow to the knee. They can also be caused by abnormal twisting, bending, or falling on the knee. Pain, bruising, or swelling may be severe, and may start within minutes of the injury. Overuse is another cause of knee pain. Other causes are climbing stairs, kneeling, and other activities that use the knee. Everyday wear and tear, especially as you get older, also can cause knee pain. Rest, along with home treatment, often relieves pain and allows your knee to heal. If you have a serious knee injury, you may need tests and treatment. Follow-up care is a key part of your treatment and safety. Be sure to make and go to all appointments, and call your doctor if you are having problems. It's also a good idea to know your test results and keep a list of the medicines you take. How can you care for yourself at home? · Be safe with medicines. Read and follow all instructions on the label. ? If the doctor gave you a prescription medicine for pain, take it as prescribed. ? If you are not taking a prescription pain medicine, ask your doctor if you can take an over-the-counter medicine. · Rest and protect your knee. Take a break from any activity that may cause pain. · Put ice or a cold pack on your knee for 10 to 20 minutes at a time. Put a thin cloth between the ice and your skin. · Prop up a sore knee on a pillow when you ice it or anytime you sit or lie down for the next 3 days. Try to keep it above the level of your heart. This will help reduce swelling. · If your knee is not swollen, you can put moist heat, a heating pad, or a warm cloth on your knee. · If your doctor recommends an elastic bandage, sleeve, or other type of support for your knee, wear it as directed.   · Follow your doctor's instructions about how much weight you can put on your leg. Use a cane, crutches, or a walker as instructed. · Follow your doctor's instructions about activity during your healing process. If you can do mild exercise, slowly increase your activity. · Reach and stay at a healthy weight. Extra weight can strain the joints, especially the knees and hips, and make the pain worse. Losing even a few pounds may help. When should you call for help? Call 911 anytime you think you may need emergency care. For example, call if:    · You have symptoms of a blood clot in your lung (called a pulmonary embolism). These may include:  ? Sudden chest pain. ? Trouble breathing. ? Coughing up blood. Call your doctor now or seek immediate medical care if:    · You have severe or increasing pain.     · Your leg or foot turns cold or changes color.     · You cannot stand or put weight on your knee.     · Your knee looks twisted or bent out of shape.     · You cannot move your knee.     · You have signs of infection, such as:  ? Increased pain, swelling, warmth, or redness. ? Red streaks leading from the knee. ? Pus draining from a place on your knee. ? A fever.     · You have signs of a blood clot in your leg (called a deep vein thrombosis), such as:  ? Pain in your calf, back of the knee, thigh, or groin. ? Redness and swelling in your leg or groin. Watch closely for changes in your health, and be sure to contact your doctor if:    · You have tingling, weakness, or numbness in your knee.     · You have any new symptoms, such as swelling.     · You have bruises from a knee injury that last longer than 2 weeks.     · You do not get better as expected. Where can you learn more? Go to http://alla-donaldo.info/  Enter K195 in the search box to learn more about \"Knee Pain or Injury: Care Instructions. \"  Current as of: June 26, 2019               Content Version: 12.6  © 6640-1612 [employer].    Care instructions adapted under license by [employer] (which disclaims liability or warranty for this information). If you have questions about a medical condition or this instruction, always ask your healthcare professional. [address] any warranty or liability for your use of this information.

## 2020-09-08 NOTE — PROGRESS NOTES
Name: Marcello Duval    : 1950  Service Dept: [address] [facility] MEDICINE       Chief Complaint   Patient presents with    Shoulder Pain     bilateral, C/O shoulder,hip,thigh,knee,neck,back        Patient's Pharmacies:    [facility] [address] [place] [address]  Phone: [phone] Fax: [phone]       Visit Vitals  Temp 98.7 °F (37.1 °C)   Ht 5' (1.524 m)   Wt 175 lb (79.4 kg)   BMI 34.18 kg/m²        Allergies   Allergen Reactions    Cortisporin [Neomycin-Bacitracin-Poly-Hc] Other (comments)     tininitus    Morphine Other (comments)    Neomycin-Polymyxin-Hc Other (comments)    Oxycodone-Acetaminophen Itching    Oxycodone-Aspirin Other (comments)     Patient denies allergy        Current Outpatient Medications   Medication Sig Dispense Refill    hydrocortisone (HYTONE) 2.5 % topical cream Apply  to affected area.  methIMAzole (TAPAZOLE) 5 mg tablet Take 5 mg by mouth daily.  esomeprazole (NEXIUM) 40 mg capsule Take 40 mg by mouth daily.  atorvastatin (LIPITOR) 40 mg tablet Take 20 mg by mouth daily.  metoprolol tartrate (LOPRESSOR) 25 mg tablet Take 25 mg by mouth two (2) times a day.  meloxicam (MOBIC) 15 mg tablet Take 15 mg by mouth daily.  albuterol (PROVENTIL HFA, VENTOLIN HFA, PROAIR HFA) 90 mcg/actuation inhaler Take 2 Puffs by inhalation every four (4) hours as needed for Wheezing.  tiotropium (SPIRIVA) 18 mcg inhalation capsule Take 1 Cap by inhalation daily.  beclomethasone (QVAR) 80 mcg/actuation inhaler Take 1 Puff by inhalation two (2) times a day.  nitroglycerin (NITROSTAT) 0.4 mg SL tablet by SubLINGual route every five (5) minutes as needed for Chest Pain.  lisinopril-hydrochlorothiazide (PRINZIDE, ZESTORETIC) 10-12.5 mg per tablet Take  by mouth daily.  omeprazole (PRILOSEC) 20 mg capsule Take 40 mg by mouth daily.       ALPRAZolam Jorge Meals) 0.25 mg tablet Take 0.5 mg by mouth three (3) times daily (with meals).  traMADol (ULTRAM) 50 mg tablet Take 50 mg by mouth three (3) times daily.  aspirin delayed-release 81 mg tablet Take  by mouth daily.  CALCIUM CARBONATE/VITAMIN D3 (CALCIUM 600 WITH VITAMIN D3 PO) Take  by mouth three (3) times daily.  acetaminophen (TYLENOL) 500 mg tablet Take 1,000 mg by mouth three (3) times daily.  HYDROcodone-acetaminophen (NORCO) 7.5-325 mg per tablet Take 1 Tab by mouth every eight (8) hours as needed for Pain. Max Daily Amount: 3 Tabs. 90 Tab 0    gabapentin (NEURONTIN) 300 mg capsule Take 1 Cap by mouth three (3) times daily. 90 Cap 3    naproxen (NAPROSYN) 500 mg tablet Take 1 Tab by mouth two (2) times daily (with meals).  60 Tab 3     Current Facility-Administered Medications   Medication Dose Route Frequency Provider Last Rate Last Dose    lidocaine (XYLOCAINE) 10 mg/mL (1 %) injection 9 mL  9 mL Intra artICUlar ONCE Jarrod Hemphill MD        triamcinolone acetonide (KENALOG-40) 40 mg/mL injection 40 mg  40 mg Intra artICUlar ONCE Power Sewell MD            Patient Active Problem List   Diagnosis Code    Lumbago M54.5    Spondylisthesis M43.10    Spinal stenosis of lumbar region at multiple levels M48.061    Lumbar spinal stenosis M48.061    S/P lumbar fusion Z98.1    Cervicogenic headache R51    Occipital neuralgia of right side M54.81    Spondylosis of cervical region without myelopathy or radiculopathy M47.812    Chronic pain syndrome G89.4    Facet arthropathy, cervical M47.812        Family History   Problem Relation Age of Onset    No Known Problems Mother     No Known Problems Father     Cancer Sister     Cancer Brother         Social History     Socioeconomic History    Marital status:      Spouse name: Not on file    Number of children: Not on file    Years of education: Not on file    Highest education level: Not on file   Occupational History    Occupation: retired   Tobacco Use    Smoking status: Former Smoker    Smokeless tobacco: Never Used    Tobacco comment: quit smoking 2000   Substance and Sexual Activity    Alcohol use: No    Drug use: No        Past Surgical History:   Procedure Laterality Date    HX APPENDECTOMY      HX BACK SURGERY  06/01/2016    L4/5 Lami Fusion    HX BREAST REDUCTION      HX CERVICAL FUSION  1990    ACDF    HX CHOLECYSTECTOMY      HX HEENT      HX HERNIA REPAIR      hiatal hernia    HX HYSTERECTOMY      HX MOHS PROCEDURES Right     HX TONSIL AND ADENOIDECTOMY      PLASTIC SURGERY, NECK  1990    ACDF by Dr. Janis Wright        Past Medical History:   Diagnosis Date    Anginal pain ([address]     Chronic obstructive pulmonary disease (HCC)     GERD (gastroesophageal reflux disease)     Hypertension     Lung disease     Thyroid condition     enlarged    Tumors     fatty tissue        I have reviewed and agree with [facility] [address] and ROS and intake form in chart and the record. Review of Systems:   Patient is a pleasant appearing individual, appropriately dressed, well hydrated, well nourished, who is alert, appropriately oriented for age, and in no acute distress with a crutch bound gait and normal affect who does not appear to be in any significant pain. Physical Exam:  Right Knee -Decrease range of motion with flexion, Knee arc of greater than 50 degrees, Some crepitation, Grossly neurovascularly intact, Good cap refill, No skin lesion, Moderate swelling, No gross instability, Some quadriceps weakness, Kellgren and Griffin at least grade 3    Left Knee - Full Range of Motion, No crepitation, Grossly neurovascularly intact, Good cap refill, No skin lesion, No swelling, No gross instability, No quadriceps weakness    Procedure Documentation:    Please note that Springpad ultrasound was used to perform an ultrasound guided injection into the right knee. A pre-injection ultrasound was taken of the right knee. After the needle was placed into the anterolateral portal(s) and while the kenelog was injected another ultrasound picture confirmed the appropriate placement. The site of injection, right knee, was sterilely prepped. The injection of 40 mg Kenalog and Lidocaine was administered appropriately in right knee and the patient tolerated it well. No site reaction was identified. Appropriate dressing was placed. Consent was obtained for the injection. Encounter Diagnoses     ICD-10-CM ICD-9-CM   1. Right knee pain, unspecified chronicity  M25.561 719.46   2. Primary osteoarthritis of right knee  M17.11 715.16   3. Acute pain of right knee  M25.561 719.46          Scribed by Kristan Henning LPN as dictated by [facility] COURTNEY Nevarez MD.    HPI:  The patient is here with a chief complaint of right knee pain, sharp burning pain, progressively getting worse. Nothing has helped. Pain is 10/10. ROS:  10-point review of systems is positive for nighttime pain and weakness and fatigue. X-rays of the right knee are positive for severe osteoarthritis (OA) done in our office. Assessment/Plan:  1. Right knee arthritic flare. Plan will be for cortisone injection right knee. If it helps that is all we need to do. We will see the patient back in three weeks. If no better we will talk to her about a knee replacement. Return to Office: Follow-up Information    None             Documentation True and Accepted Jarrod Nevarez MD

## 2020-09-29 ENCOUNTER — OFFICE VISIT (OUTPATIENT)
Dept: ORTHOPEDIC SURGERY | Age: 70
End: 2020-09-29
Payer: MEDICARE

## 2020-09-29 VITALS — BODY MASS INDEX: 32.2 KG/M2 | TEMPERATURE: 97.8 F | HEIGHT: 62 IN | WEIGHT: 175 LBS

## 2020-09-29 DIAGNOSIS — M17.11 PRIMARY OSTEOARTHRITIS OF RIGHT KNEE: ICD-10-CM

## 2020-09-29 DIAGNOSIS — M25.561 ACUTE PAIN OF RIGHT KNEE: Primary | ICD-10-CM

## 2020-09-29 PROCEDURE — 99214 OFFICE O/P EST MOD 30 MIN: CPT | Performed by: ORTHOPAEDIC SURGERY

## 2020-09-29 NOTE — PROGRESS NOTES
Providers protocol for the intake nurse to complete in patient's chart:    Chace Johns presents today for   Chief Complaint   Patient presents with    Knee Pain     right    Hip Pain     right     Incomplete intake information available.   Reason for visit  Pain assessment   Height  Weight    Temperature is taken by [facility]  Travel Screening done by [facility]    Provider will complete the patient's chart

## 2020-09-29 NOTE — PATIENT INSTRUCTIONS
Knee Pain or Injury: Care Instructions  Your Care Instructions     Injuries are a common cause of knee problems. Sudden (acute) injuries may be caused by a direct blow to the knee. They can also be caused by abnormal twisting, bending, or falling on the knee. Pain, bruising, or swelling may be severe, and may start within minutes of the injury. Overuse is another cause of knee pain. Other causes are climbing stairs, kneeling, and other activities that use the knee. Everyday wear and tear, especially as you get older, also can cause knee pain. Rest, along with home treatment, often relieves pain and allows your knee to heal. If you have a serious knee injury, you may need tests and treatment. Follow-up care is a key part of your treatment and safety. Be sure to make and go to all appointments, and call your doctor if you are having problems. It's also a good idea to know your test results and keep a list of the medicines you take. How can you care for yourself at home? · Be safe with medicines. Read and follow all instructions on the label. ? If the doctor gave you a prescription medicine for pain, take it as prescribed. ? If you are not taking a prescription pain medicine, ask your doctor if you can take an over-the-counter medicine. · Rest and protect your knee. Take a break from any activity that may cause pain. · Put ice or a cold pack on your knee for 10 to 20 minutes at a time. Put a thin cloth between the ice and your skin. · Prop up a sore knee on a pillow when you ice it or anytime you sit or lie down for the next 3 days. Try to keep it above the level of your heart. This will help reduce swelling. · If your knee is not swollen, you can put moist heat, a heating pad, or a warm cloth on your knee. · If your doctor recommends an elastic bandage, sleeve, or other type of support for your knee, wear it as directed.   · Follow your doctor's instructions about how much weight you can put on your leg. Use a cane, crutches, or a walker as instructed. · Follow your doctor's instructions about activity during your healing process. If you can do mild exercise, slowly increase your activity. · Reach and stay at a healthy weight. Extra weight can strain the joints, especially the knees and hips, and make the pain worse. Losing even a few pounds may help. When should you call for help? Call 911 anytime you think you may need emergency care. For example, call if:    · You have symptoms of a blood clot in your lung (called a pulmonary embolism). These may include:  ? Sudden chest pain. ? Trouble breathing. ? Coughing up blood. Call your doctor now or seek immediate medical care if:    · You have severe or increasing pain.     · Your leg or foot turns cold or changes color.     · You cannot stand or put weight on your knee.     · Your knee looks twisted or bent out of shape.     · You cannot move your knee.     · You have signs of infection, such as:  ? Increased pain, swelling, warmth, or redness. ? Red streaks leading from the knee. ? Pus draining from a place on your knee. ? A fever.     · You have signs of a blood clot in your leg (called a deep vein thrombosis), such as:  ? Pain in your calf, back of the knee, thigh, or groin. ? Redness and swelling in your leg or groin. Watch closely for changes in your health, and be sure to contact your doctor if:    · You have tingling, weakness, or numbness in your knee.     · You have any new symptoms, such as swelling.     · You have bruises from a knee injury that last longer than 2 weeks.     · You do not get better as expected. Where can you learn more? Go to http://alla-donaldo.info/  Enter K195 in the search box to learn more about \"Knee Pain or Injury: Care Instructions. \"  Current as of: June 26, 2019               Content Version: 12.6  © 5476-2039 [employer].    Care instructions adapted under license by [employer] (which disclaims liability or warranty for this information). If you have questions about a medical condition or this instruction, always ask your healthcare professional. [address] any warranty or liability for your use of this information.

## 2020-09-29 NOTE — PROGRESS NOTES
Name: Lang Devine    : 1950     Service Dept: [facility] and Sports Medicine    Patient's Pharmacies:    [place] [address]  Phone: [phone] Fax: [phone]       Chief Complaint   Patient presents with    Knee Pain     right    Hip Pain     right        Visit Vitals  Temp 97.8 °F (36.6 °C)   Ht 5' 2\" (1.575 m)   Wt 175 lb (79.4 kg)   BMI 32.01 kg/m²        Allergies   Allergen Reactions    Cortisporin [Neomycin-Bacitracin-Poly-Hc] Other (comments)     tininitus    Morphine Other (comments)    Neomycin-Polymyxin-Hc Other (comments)    Oxycodone-Acetaminophen Itching    Oxycodone-Aspirin Other (comments)     Patient denies allergy        Current Outpatient Medications   Medication Sig Dispense Refill    hydrocortisone (HYTONE) 2.5 % topical cream Apply  to affected area.  methIMAzole (TAPAZOLE) 5 mg tablet Take 5 mg by mouth daily.  HYDROcodone-acetaminophen (NORCO) 7.5-325 mg per tablet Take 1 Tab by mouth every eight (8) hours as needed for Pain. Max Daily Amount: 3 Tabs. 90 Tab 0    gabapentin (NEURONTIN) 300 mg capsule Take 1 Cap by mouth three (3) times daily. 90 Cap 3    naproxen (NAPROSYN) 500 mg tablet Take 1 Tab by mouth two (2) times daily (with meals). 60 Tab 3    esomeprazole (NEXIUM) 40 mg capsule Take 40 mg by mouth daily.  atorvastatin (LIPITOR) 40 mg tablet Take 20 mg by mouth daily.  metoprolol tartrate (LOPRESSOR) 25 mg tablet Take 25 mg by mouth two (2) times a day.  meloxicam (MOBIC) 15 mg tablet Take 15 mg by mouth daily.  albuterol (PROVENTIL HFA, VENTOLIN HFA, PROAIR HFA) 90 mcg/actuation inhaler Take 2 Puffs by inhalation every four (4) hours as needed for Wheezing.  tiotropium (SPIRIVA) 18 mcg inhalation capsule Take 1 Cap by inhalation daily.       beclomethasone (QVAR) 80 mcg/actuation inhaler Take 1 Puff by inhalation two (2) times a day.  nitroglycerin (NITROSTAT) 0.4 mg SL tablet by SubLINGual route every five (5) minutes as needed for Chest Pain.  lisinopril-hydrochlorothiazide (PRINZIDE, ZESTORETIC) 10-12.5 mg per tablet Take  by mouth daily.  omeprazole (PRILOSEC) 20 mg capsule Take 40 mg by mouth daily.  ALPRAZolam (XANAX) 0.25 mg tablet Take 0.5 mg by mouth three (3) times daily (with meals).  traMADol (ULTRAM) 50 mg tablet Take 50 mg by mouth three (3) times daily.  aspirin delayed-release 81 mg tablet Take  by mouth daily.  CALCIUM CARBONATE/VITAMIN D3 (CALCIUM 600 WITH VITAMIN D3 PO) Take  by mouth three (3) times daily.  acetaminophen (TYLENOL) 500 mg tablet Take 1,000 mg by mouth three (3) times daily.           Patient Active Problem List   Diagnosis Code    Lumbago M54.5    Spondylisthesis M43.10    Spinal stenosis of lumbar region at multiple levels M48.061    Lumbar spinal stenosis M48.061    S/P lumbar fusion Z98.1    Cervicogenic headache R51    Occipital neuralgia of right side M54.81    Spondylosis of cervical region without myelopathy or radiculopathy M47.812    Chronic pain syndrome G89.4    Facet arthropathy, cervical M47.812        Family History   Problem Relation Age of Onset    No Known Problems Mother     No Known Problems Father     Cancer Sister     Cancer Brother         Social History     Socioeconomic History    Marital status:      Spouse name: Not on file    Number of children: Not on file    Years of education: Not on file    Highest education level: Not on file   Occupational History    Occupation: retired   Tobacco Use    Smoking status: Former Smoker    Smokeless tobacco: Never Used    Tobacco comment: quit smoking 2000   Substance and Sexual Activity    Alcohol use: No    Drug use: No        Past Surgical History:   Procedure Laterality Date    HX APPENDECTOMY      HX BACK SURGERY  06/01/2016    L4/5 Lami Fusion    HX BREAST REDUCTION      HX CERVICAL FUSION  1990    ACDF    HX CHOLECYSTECTOMY      HX HEENT      HX HERNIA REPAIR      hiatal hernia    HX HYSTERECTOMY      HX MOHS PROCEDURES Right     HX TONSIL AND ADENOIDECTOMY      PLASTIC SURGERY, NECK  1990    ACDF by Dr. Kyra Collins        Past Medical History:   Diagnosis Date    [facility] [address] [address]     Chronic obstructive pulmonary disease ([facility] [address]     GERD (gastroesophageal reflux disease)     Hypertension     Lung disease     Thyroid condition     enlarged    Tumors     fatty tissue        I have reviewed and agree [facility] and ROS and intake form in chart and the record. Patient is a pleasant appearing individual, appropriately dressed, well hydrated, well nourished, who is alert, appropriately oriented for age, and in no acute distress with a normal gait and normal affect who does not appear to be in any significant pain. Encounter Diagnoses     ICD-10-CM ICD-9-CM   1. Acute pain of right knee  M25.561 719.46   2. Primary osteoarthritis of right knee  M17.11 715.16          HPI:  The patient is here with a chief complaint of right knee pain, throbbing, burning pain, progressively getting worse. Injection did not help. X-rays are positive for severe OA. Pain is 10/10. ROS:  10-point review of systems is positive for joint infection, nighttime pain, and fatigue. X-rays of the right knee are unremarkable except for severe OA, failed conservative treatment. Assessment/Plan:  Plan would be for right total knee replacement with general medical clearance, cardiac clearance, outpatient surgery, but we will want to make sure because afterwards she does not have much help at home so we will do the first 2 weeks with home health but she will go home the day of the surgery but we will set up the home health prior to her surgery date for 2 weeks.   She does have an extensive cardiac history so we want to make sure the cardiologist says everything is okay as well and go from there. Return to Office: Follow-up and Dispositions    · Return for schedule for surgery. Scribed by Tanya Blake LPN as dictated by [facility] COURTNEY Gomez MD.    Documentation True and Accepted Jarrod Gomez MD                    Name: Lynne Rojas    : 1950     Service Dept: [address] [place] [address] and Sports Medicine    Patient's Pharmacies:    [facility] [address] [place] [address]  Phone: [phone] Fax: [phone]       Chief Complaint   Patient presents with    Knee Pain     right    Hip Pain     right        Visit Vitals  Temp 97.8 °F (36.6 °C)   Ht 5' 2\" (1.575 m)   Wt 175 lb (79.4 kg)   BMI 32.01 kg/m²        Allergies   Allergen Reactions    Cortisporin [Neomycin-Bacitracin-Poly-Hc] Other (comments)     tininitus    Morphine Other (comments)    Neomycin-Polymyxin-Hc Other (comments)    Oxycodone-Acetaminophen Itching    Oxycodone-Aspirin Other (comments)     Patient denies allergy        Current Outpatient Medications   Medication Sig Dispense Refill    hydrocortisone (HYTONE) 2.5 % topical cream Apply  to affected area.  methIMAzole (TAPAZOLE) 5 mg tablet Take 5 mg by mouth daily.  HYDROcodone-acetaminophen (NORCO) 7.5-325 mg per tablet Take 1 Tab by mouth every eight (8) hours as needed for Pain. Max Daily Amount: 3 Tabs. 90 Tab 0    gabapentin (NEURONTIN) 300 mg capsule Take 1 Cap by mouth three (3) times daily. 90 Cap 3    naproxen (NAPROSYN) 500 mg tablet Take 1 Tab by mouth two (2) times daily (with meals). 60 Tab 3    esomeprazole (NEXIUM) 40 mg capsule Take 40 mg by mouth daily.  atorvastatin (LIPITOR) 40 mg tablet Take 20 mg by mouth daily.  metoprolol tartrate (LOPRESSOR) 25 mg tablet Take 25 mg by mouth two (2) times a day.  meloxicam (MOBIC) 15 mg tablet Take 15 mg by mouth daily.       albuterol (PROVENTIL HFA, VENTOLIN HFA, PROAIR HFA) 90 mcg/actuation inhaler Take 2 Puffs by inhalation every four (4) hours as needed for Wheezing.  tiotropium (SPIRIVA) 18 mcg inhalation capsule Take 1 Cap by inhalation daily.  beclomethasone (QVAR) 80 mcg/actuation inhaler Take 1 Puff by inhalation two (2) times a day.  nitroglycerin (NITROSTAT) 0.4 mg SL tablet by SubLINGual route every five (5) minutes as needed for Chest Pain.  lisinopril-hydrochlorothiazide (PRINZIDE, ZESTORETIC) 10-12.5 mg per tablet Take  by mouth daily.  omeprazole (PRILOSEC) 20 mg capsule Take 40 mg by mouth daily.  ALPRAZolam (XANAX) 0.25 mg tablet Take 0.5 mg by mouth three (3) times daily (with meals).  traMADol (ULTRAM) 50 mg tablet Take 50 mg by mouth three (3) times daily.  aspirin delayed-release 81 mg tablet Take  by mouth daily.  CALCIUM CARBONATE/VITAMIN D3 (CALCIUM 600 WITH VITAMIN D3 PO) Take  by mouth three (3) times daily.  acetaminophen (TYLENOL) 500 mg tablet Take 1,000 mg by mouth three (3) times daily.           Patient Active Problem List   Diagnosis Code    Lumbago M54.5    Spondylisthesis M43.10    Spinal stenosis of lumbar region at multiple levels M48.061    Lumbar spinal stenosis M48.061    S/P lumbar fusion Z98.1    Cervicogenic headache R51    Occipital neuralgia of right side M54.81    Spondylosis of cervical region without myelopathy or radiculopathy M47.812    Chronic pain syndrome G89.4    Facet arthropathy, cervical M47.812        Family History   Problem Relation Age of Onset    No Known Problems Mother     No Known Problems Father     Cancer Sister     Cancer Brother         Social History     Socioeconomic History    Marital status:      Spouse name: Not on file    Number of children: Not on file    Years of education: Not on file    Highest education level: Not on file   Occupational History    Occupation: retired   Tobacco Use    Smoking status: Former Smoker    Smokeless tobacco: Never Used    Tobacco comment: quit smoking 2000   Substance and Sexual Activity    Alcohol use: No    Drug use: No        Past Surgical History:   Procedure Laterality Date    HX APPENDECTOMY      HX BACK SURGERY  06/01/2016    L4/5 Lami Fusion    HX BREAST REDUCTION      HX CERVICAL FUSION  1990    ACDF    HX CHOLECYSTECTOMY      HX HEENT      HX HERNIA REPAIR      hiatal hernia    HX HYSTERECTOMY      HX MOHS PROCEDURES Right     HX TONSIL AND ADENOIDECTOMY      PLASTIC SURGERY, NECK  1990    ACDF by Dr. Charleen Sun        Past Medical History:   Diagnosis Date    Anginal pain ([address]     Chronic obstructive pulmonary disease (HCC)     GERD (gastroesophageal reflux disease)     Hypertension     Lung disease     Thyroid condition     enlarged    Tumors     fatty tissue        I have reviewed and agree with [address] [place] [address] [address] and ROS and intake form in chart and the record. Review of Systems:   Patient is a pleasant appearing individual, appropriately dressed, well hydrated, well nourished, who is alert, appropriately oriented for age, and in no acute distress with a crutch bound gait and normal affect who does not appear to be in any significant pain. Physical Exam:  Right Knee -Decrease range of motion with flexion, Knee arc of greater than 50 degrees, Some crepitation, Grossly neurovascularly intact, Good cap refill, No skin lesion, Moderate swelling, No gross instability, Some quadriceps weakness, Kellgren and Griffin at least grade 3    Left Knee - Full Range of Motion, No crepitation, Grossly neurovascularly intact, Good cap refill, No skin lesion, No swelling, No gross instability, No quadriceps weakness       Encounter Diagnoses     ICD-10-CM ICD-9-CM   1. Acute pain of right knee  M25.561 719.46   2. Primary osteoarthritis of right knee  M17.11 715.16          HPI:  The patient is here with a chief complaint of right knee pain, throbbing, burning pain, progressively getting worse. Injection did not help. X-rays are positive for severe OA. Pain is 10/10. ROS:  10-point review of systems is positive for joint infection, nighttime pain, and fatigue. X-rays of the right knee are unremarkable except for severe OA, failed conservative treatment. Assessment/Plan:  Plan would be for right total knee replacement with general medical clearance, cardiac clearance, outpatient surgery, but we will want to make sure because afterwards she does not have much help at home so we will do the first 2 weeks with home health but she will go home the day of the surgery. We will set up the home health prior to her surgery date for 2 weeks. She does have an extensive cardiac history so we want to make sure the cardiologist says everything is okay as well and go from there. Return to Office: Follow-up and Dispositions    · Return for schedule for surgery. Scribed by Jose M Jones LPN as dictated by [facility] COURTNEY Gallegos MD.    Documentation True and Accepted Jarrod COURTNEY Galleogs MD

## 2020-12-01 DIAGNOSIS — M17.11 PRIMARY OSTEOARTHRITIS OF RIGHT KNEE: ICD-10-CM

## 2020-12-01 DIAGNOSIS — M25.561 ACUTE PAIN OF RIGHT KNEE: ICD-10-CM

## 2020-12-07 DIAGNOSIS — M17.11 PRIMARY OSTEOARTHRITIS OF RIGHT KNEE: ICD-10-CM

## 2020-12-07 DIAGNOSIS — M25.561 ACUTE PAIN OF RIGHT KNEE: ICD-10-CM

## 2020-12-28 ENCOUNTER — OFFICE VISIT (OUTPATIENT)
Dept: ORTHOPEDIC SURGERY | Age: 70
End: 2020-12-28
Payer: MEDICARE

## 2020-12-28 ENCOUNTER — HOSPITAL ENCOUNTER (OUTPATIENT)
Dept: PREADMISSION TESTING | Age: 70
Discharge: HOME | End: 2020-12-28
Payer: MEDICARE

## 2020-12-28 VITALS — BODY MASS INDEX: 33.77 KG/M2 | HEIGHT: 60 IN | WEIGHT: 172 LBS

## 2020-12-28 DIAGNOSIS — M17.11 PRIMARY OSTEOARTHRITIS OF RIGHT KNEE: Primary | ICD-10-CM

## 2020-12-28 LAB — SARS-COV-2, COV2: NORMAL

## 2020-12-28 PROCEDURE — G8400 PT W/DXA NO RESULTS DOC: HCPCS | Performed by: ORTHOPAEDIC SURGERY

## 2020-12-28 PROCEDURE — G8419 CALC BMI OUT NRM PARAM NOF/U: HCPCS | Performed by: ORTHOPAEDIC SURGERY

## 2020-12-28 PROCEDURE — G8427 DOCREV CUR MEDS BY ELIG CLIN: HCPCS | Performed by: ORTHOPAEDIC SURGERY

## 2020-12-28 PROCEDURE — 3017F COLORECTAL CA SCREEN DOC REV: CPT | Performed by: ORTHOPAEDIC SURGERY

## 2020-12-28 PROCEDURE — G8536 NO DOC ELDER MAL SCRN: HCPCS | Performed by: ORTHOPAEDIC SURGERY

## 2020-12-28 PROCEDURE — G8432 DEP SCR NOT DOC, RNG: HCPCS | Performed by: ORTHOPAEDIC SURGERY

## 2020-12-28 PROCEDURE — 1101F PT FALLS ASSESS-DOCD LE1/YR: CPT | Performed by: ORTHOPAEDIC SURGERY

## 2020-12-28 PROCEDURE — 99214 OFFICE O/P EST MOD 30 MIN: CPT | Performed by: ORTHOPAEDIC SURGERY

## 2020-12-28 PROCEDURE — 87635 SARS-COV-2 COVID-19 AMP PRB: CPT

## 2020-12-28 PROCEDURE — 1090F PRES/ABSN URINE INCON ASSESS: CPT | Performed by: ORTHOPAEDIC SURGERY

## 2020-12-28 RX ORDER — CEPHALEXIN 500 MG/1
500 CAPSULE ORAL EVERY 8 HOURS
Qty: 3 CAP | Refills: 0 | Status: SHIPPED | OUTPATIENT
Start: 2020-12-28 | End: 2020-12-29

## 2020-12-28 RX ORDER — HYDROMORPHONE HYDROCHLORIDE 2 MG/1
2 TABLET ORAL
Qty: 30 TAB | Refills: 0 | Status: SHIPPED | OUTPATIENT
Start: 2020-12-28 | End: 2021-01-11

## 2020-12-28 NOTE — PROGRESS NOTES
Name: Mayra Escalera    : 1950     Service Dept: [address] [facility] and Sports Medicine    Patient's Pharmacies:    [facility] [address] [place] [address]  Phone: [phone] Fax: [phone]       Chief Complaint   Patient presents with    Knee Pain    Pre-op Exam        Visit Vitals  Ht 5' (1.524 m)   Wt 172 lb (78 kg)   BMI 33.59 kg/m²      Allergies   Allergen Reactions    Cortisporin [Neomycin-Bacitracin-Poly-Hc] Other (comments)     tininitus    Morphine Other (comments)    Neomycin-Polymyxin-Hc Other (comments)    Oxycodone-Acetaminophen Itching    Oxycodone-Aspirin Other (comments)     Patient denies allergy      Current Outpatient Medications   Medication Sig Dispense Refill    hydrocortisone (HYTONE) 2.5 % topical cream Apply  to affected area.  methIMAzole (TAPAZOLE) 5 mg tablet Take 5 mg by mouth daily.  HYDROcodone-acetaminophen (NORCO) 7.5-325 mg per tablet Take 1 Tab by mouth every eight (8) hours as needed for Pain. Max Daily Amount: 3 Tabs. 90 Tab 0    gabapentin (NEURONTIN) 300 mg capsule Take 1 Cap by mouth three (3) times daily. 90 Cap 3    naproxen (NAPROSYN) 500 mg tablet Take 1 Tab by mouth two (2) times daily (with meals). 60 Tab 3    esomeprazole (NEXIUM) 40 mg capsule Take 40 mg by mouth daily.  atorvastatin (LIPITOR) 40 mg tablet Take 20 mg by mouth daily.  metoprolol tartrate (LOPRESSOR) 25 mg tablet Take 25 mg by mouth two (2) times a day.  meloxicam (MOBIC) 15 mg tablet Take 15 mg by mouth daily.  albuterol (PROVENTIL HFA, VENTOLIN HFA, PROAIR HFA) 90 mcg/actuation inhaler Take 2 Puffs by inhalation every four (4) hours as needed for Wheezing.  tiotropium (SPIRIVA) 18 mcg inhalation capsule Take 1 Cap by inhalation daily.  beclomethasone (QVAR) 80 mcg/actuation inhaler Take 1 Puff by inhalation two (2) times a day.       nitroglycerin (NITROSTAT) 0.4 mg SL tablet by SubLINGual route every five (5) minutes as needed for Chest Pain.  lisinopril-hydrochlorothiazide (PRINZIDE, ZESTORETIC) 10-12.5 mg per tablet Take  by mouth daily.  omeprazole (PRILOSEC) 20 mg capsule Take 40 mg by mouth daily.  ALPRAZolam (XANAX) 0.25 mg tablet Take 0.5 mg by mouth three (3) times daily (with meals).  traMADol (ULTRAM) 50 mg tablet Take 50 mg by mouth three (3) times daily.  aspirin delayed-release 81 mg tablet Take  by mouth daily.  CALCIUM CARBONATE/VITAMIN D3 (CALCIUM 600 WITH VITAMIN D3 PO) Take  by mouth three (3) times daily.  acetaminophen (TYLENOL) 500 mg tablet Take 1,000 mg by mouth three (3) times daily.         Patient Active Problem List   Diagnosis Code    Lumbago M54.5    Spondylisthesis M43.10    Spinal stenosis of lumbar region at multiple levels M48.061    Lumbar spinal stenosis M48.061    S/P lumbar fusion Z98.1    Cervicogenic headache R51.9    Occipital neuralgia of right side M54.81    Spondylosis of cervical region without myelopathy or radiculopathy M47.812    Chronic pain syndrome G89.4    Facet arthropathy, cervical M47.812      Family History   Problem Relation Age of Onset    No Known Problems Mother     No Known Problems Father     Cancer Sister     Cancer Brother       Social History     Socioeconomic History    Marital status:      Spouse name: Not on file    Number of children: Not on file    Years of education: Not on file    Highest education level: Not on file   Occupational History    Occupation: retired   Tobacco Use    Smoking status: Former Smoker     Packs/day: 0.50     Years: 0.50     Pack years: 0.25     Quit date: 2020     Years since quittin.9    Smokeless tobacco: Never Used    Tobacco comment: quit smoking    Substance and Sexual Activity    Alcohol use: No    Drug use: No      Past Surgical History:   Procedure Laterality Date    HX APPENDECTOMY      HX BACK SURGERY  06/01/2016    L4/5 Lami Fusion    HX BREAST REDUCTION      HX CERVICAL FUSION  1990    ACDF    HX CHOLECYSTECTOMY      HX HEENT      HX HERNIA REPAIR      hiatal hernia    HX HYSTERECTOMY      HX MOHS PROCEDURES Right     HX TONSIL AND ADENOIDECTOMY      PLASTIC SURGERY, NECK  1990    ACDF by Dr. Yenifer Arzate      Past Medical History:   Diagnosis Date    Ang[facility] ([facility] [address]     Chronic obstructive pulmonary disease (HCC)     GERD (gastroesophageal reflux disease)     Hypertension     Lung disease     Thyroid condition     enlarged    Tumors     fatty tissue        I have reviewed and agree with PFSH and ROS and intake form in chart and the record. Review of Systems:   Patient is a pleasant appearing individual, appropriately dressed, well hydrated, well nourished, who is alert, appropriately oriented for age, and in no acute distress with a crutch bound gait and normal affect who does not appear to be in any significant pain. Physical Exam:  Right Knee -Decrease range of motion with flexion, Knee arc of greater than 50 degrees, Some crepitation, Grossly neurovascularly intact, Good cap refill, No skin lesion, Moderate swelling, No gross instability, Some quadriceps weakness, Kellgren and Griffin at least grade 3    Left Knee - Full Range of Motion, No crepitation, Grossly neurovascularly intact, Good cap refill, No skin lesion, No swelling, No gross instability, No quadriceps weakness    Inpatient status: The patient has admitted to severe pain in the affected knee and due to such pain they are unable to complete activities of daily living at home and/or work on a regular basis where conservative treatments have failed. After extensive discussion with the patient, they have chosen to receive a total knee replacement with the expectation of inpatient procedure.  Their dependent functional status (i.e. lack of capable support and safety at home, pain management, comorbities, or difficulty ambulating with assistive walking devices) would deem them a candidate for an inpatient stay. The patient acknowledges and understand the plan. The risks of surgery were explained to the patient which include but not limited to infection, nerve injury, artery injury, tendon injury, poor result, poor wound healing, unforeseen incidence, bleeding, infection, nerve damage, failure to improve, worsening of symptoms, morbidity, and mortality risks were explained. All questions were answered. Patient was told of no guarantees. Patient accepts all risks and benefits. A consent for surgery will be documented and signed by the patient or a legal guardian. All questions were answered. The procedure was explained in detail. The patient was counseled about the risks of martin Covid-19 during their perioperative period and any recovery window from their procedure. The patient was made aware that martin Covid-19 may worsen their prognosis for recovering from their procedure and lend to a higher morbidity and/or mortality risk. All material risks, benefits, and reasonable alternatives including postponing the procedure were discussed. The patient DOES wish to proceed with their procedure at this time. Encounter Diagnoses     ICD-10-CM ICD-9-CM   1. Primary osteoarthritis of right knee  M17.11 715.16       HPI:  The patient is here with a chief complaint of right knee pain, sharp, throbbing pain, preop appointment, diagnosed with osteoarthritis. Pain is 10/10. Assessment/Plan:  Plan would be for right total knee replacement with general medical clearance that she has had. We will do Dilaudid and Keflex and aspirin postoperatively. She is going to be set up for home health, and we will make sure that as well for the plan since she does not have any help at home, but it will be outpatient surgery. Return to Office: Follow-up and Dispositions    · Return for already william for surgery. Scribed by Radha Mcmullen as dictated by [place]. Nathan Jimenez MD.  Documentation True and Accepted Jarrod Jimenez MD

## 2020-12-28 NOTE — PATIENT INSTRUCTIONS
Knee Arthritis: Care Instructions Your Care Instructions Knee arthritis is a breakdown of the cartilage that cushions your knee joint. When the cartilage wears down, your bones rub against each other. This causes pain and stiffness. Knee arthritis tends to get worse with time. Treatment for knee arthritis involves reducing pain, making the leg muscles stronger, and staying at a healthy body weight. The treatment usually does not improve the health of the cartilage, but it can reduce pain and improve how well your knee works. You can take simple measures to protect your knee joints, ease your pain, and help you stay active. Follow-up care is a key part of your treatment and safety. Be sure to make and go to all appointments, and call your doctor if you are having problems. It's also a good idea to know your test results and keep a list of the medicines you take. How can you care for yourself at home? · Know that knee arthritis will cause more pain on some days than on others. · Stay at a healthy weight. Lose weight if you are overweight. When you stand up, the pressure on your knees from every pound of body weight is multiplied four times. So if you lose 10 pounds, you will reduce the pressure on your knees by 40 pounds. · Talk to your doctor or physical therapist about exercises that will help ease joint pain. ? Stretch to help prevent stiffness and to prevent injury before you exercise. You may enjoy gentle forms of yoga to help keep your knee joints and muscles flexible. ? Walk instead of jog. 
? Ride a bike. This makes your thigh muscles stronger and takes pressure off your knee. ? Wear well-fitting and comfortable shoes. ? Exercise in chest-deep water. This can help you exercise longer with less pain. ? Avoid exercises that include squatting or kneeling. They can put a lot of strain on your knees. ? Talk to your doctor to make sure that the exercise you do is not making the arthritis worse. · Do not sit for long periods of time. Try to walk once in a while to keep your knee from getting stiff. · Ask your doctor or physical therapist whether shoe inserts may reduce your arthritis pain. · If you can afford it, get new athletic shoes at least every year. This can help reduce the strain on your knees. · Use a device to help you do everyday activities. ? A cane or walking stick can help you keep your balance when you walk. Hold the cane or walking stick in the hand opposite the painful knee. ? If you feel like you may fall when you walk, try using crutches or a front-wheeled walker. These can prevent falls that could cause more damage to your knee. ? A knee brace may help keep your knee stable and prevent pain. ? You also can use other things to make life easier, such as a higher toilet seat and handrails in the bathtub or shower. · Take pain medicines exactly as directed. ? Do not wait until you are in severe pain. You will get better results if you take it sooner. ? If you are not taking a prescription pain medicine, take an over-the-counter medicine such as acetaminophen (Tylenol), ibuprofen (Advil, Motrin), or naproxen (Aleve). Read and follow all instructions on the label. ? Do not take two or more pain medicines at the same time unless the doctor told you to. Many pain medicines have acetaminophen, which is Tylenol. Too much acetaminophen (Tylenol) can be harmful. ? Tell your doctor if you take a blood thinner, have diabetes, or have allergies to shellfish. · Ask your doctor if you might benefit from a shot of steroid medicine into your knee. This may provide pain relief for several months. · Many people take the supplements glucosamine and chondroitin for osteoarthritis. Some people feel they help, but the medical research does not show that they work. Talk to your doctor before you take these supplements. When should you call for help? Call your doctor now or seek immediate medical care if: 
  · You have sudden swelling, warmth, or pain in your knee.  
  · You have knee pain and a fever or rash.  
  · You have such bad pain that you cannot use your knee. Watch closely for changes in your health, and be sure to contact your doctor if you have any problems. Where can you learn more? Go to http://www.gray.com/ Enter Z202 in the search box to learn more about \"Knee Arthritis: Care Instructions. \" Current as of: December 9, 2019               Content Version: 12.6 © 6426-6278 [employer]. Care instructions adapted under license by [employer] (which disclaims liability or warranty for this information). If you have questions about a medical condition or this instruction, always ask your healthcare professional. [address] any warranty or liability for your use of this information.

## 2020-12-28 NOTE — H&P (VIEW-ONLY)
Name: Nelda Dan : 1950 Service Dept: [address] [place] [address] and Sports Medicine [facility] [address] Phone: [phone] Fax: [phone] Chief Complaint Patient presents with  Knee Pain [place] Self Pre-op Exam  
 
  
Visit Vitals Ht 5' (1.524 m) Wt 172 lb (78 kg) BMI 33.59 kg/m² Allergies Allergen Reactions  Cortisporin [Neomycin-Bacitracin-Poly-Hc] Other (comments)  
  tininitus  Morphine Other (comments)  Neomycin-Polymyxin-Hc Other (comments)  Oxycodone-Acetaminophen Itching  Oxycodone-Aspirin Other (comments) Patient denies allergy Current Outpatient Medications Medication Sig Dispense Refill  hydrocortisone (HYTONE) 2.5 % topical cream Apply  to affected area.  methIMAzole (TAPAZOLE) 5 mg tablet Take 5 mg by mouth daily.  HYDROcodone-acetaminophen (NORCO) 7.5-325 mg per tablet Take 1 Tab by mouth every eight (8) hours as needed for Pain. Max Daily Amount: 3 Tabs. 90 Tab 0  
 gabapentin (NEURONTIN) 300 mg capsule Take 1 Cap by mouth three (3) times daily. 90 Cap 3  
 naproxen (NAPROSYN) 500 mg tablet Take 1 Tab by mouth two (2) times daily (with meals). 60 Tab 3  
 esomeprazole (NEXIUM) 40 mg capsule Take 40 mg by mouth daily.  atorvastatin (LIPITOR) 40 mg tablet Take 20 mg by mouth daily.  metoprolol tartrate (LOPRESSOR) 25 mg tablet Take 25 mg by mouth two (2) times a day.  meloxicam (MOBIC) 15 mg tablet Take 15 mg by mouth daily.  albuterol (PROVENTIL HFA, VENTOLIN HFA, PROAIR HFA) 90 mcg/actuation inhaler Take 2 Puffs by inhalation every four (4) hours as needed for Wheezing.  tiotropium (SPIRIVA) 18 mcg inhalation capsule Take 1 Cap by inhalation daily.  beclomethasone (QVAR) 80 mcg/actuation inhaler Take 1 Puff by inhalation two (2) times a day.  nitroglycerin (NITROSTAT) 0.4 mg SL tablet by SubLINGual route every five (5) minutes as needed for Chest Pain.  lisinopril-hydrochlorothiazide (PRINZIDE, ZESTORETIC) 10-12.5 mg per tablet Take  by mouth daily.  omeprazole (PRILOSEC) 20 mg capsule Take 40 mg by mouth daily.  ALPRAZolam (XANAX) 0.25 mg tablet Take 0.5 mg by mouth three (3) times daily (with meals).  traMADol (ULTRAM) 50 mg tablet Take 50 mg by mouth three (3) times daily.  aspirin delayed-release 81 mg tablet Take  by mouth daily.  CALCIUM CARBONATE/VITAMIN D3 (CALCIUM 600 WITH VITAMIN D3 PO) Take  by mouth three (3) times daily.  acetaminophen (TYLENOL) 500 mg tablet Take 1,000 mg by mouth three (3) times daily. Patient Active Problem List  
Diagnosis Code  Lumbago M54.5  Spondylisthesis M43.10  Spinal stenosis of lumbar region at multiple levels M48.061  Lumbar spinal stenosis M48.061  
 S/P lumbar fusion Z98.1  Cervicogenic headache R51.9  Occipital neuralgia of right side M54.81  Spondylosis of cervical region without myelopathy or radiculopathy M47.812  Chronic pain syndrome G89.4  Facet arthropathy, cervical M47.812 Family History Problem Relation Age of Onset  No Known Problems Mother  No Known Problems Father  Cancer Sister  Cancer Brother Social History Socioeconomic History  Marital status:  Spouse name: Not on file  Number of children: Not on file  Years of education: Not on file  Highest education level: Not on file Occupational History  Occupation: retired Tobacco Use  Smoking status: Former Smoker Packs/day: 0.50 Years: 0.50 Pack years: 0.25 Quit date: 2020 Years since quittin.9  Smokeless tobacco: Never Used  Tobacco comment: quit smoking  Substance and Sexual Activity  Alcohol use: No  
 Drug use: No  
  
Past Surgical History:  
Procedure Laterality Date  HX APPENDECTOMY  HX BACK SURGERY  06/01/2016 L4/5 Lami Fusion  HX BREAST REDUCTION    
 [id number] [facility] ACDF  
 HX CHOLECYSTECTOMY  HX HEENT    
 HX HERNIA REPAIR    
 hiatal hernia  HX HYSTERECTOMY  HX MOHS PROCEDURES Right  HX TONSIL AND ADENOIDECTOMY [facility] ACDF by Dr. Lexi Erickson Past Medical History:  
Diagnosis Date  Anginal pain ([address]  Chronic obstructive pulmonary disease ([address]  GERD (gastroesophageal reflux disease)  Hypertension  Lung disease  Thyroid condition   
 enlarged  Tumors   
 fatty tissue I have reviewed and agree with [address] [place] [address] Nw and ROS and intake form in chart and the record. Review of Systems:  
Patient is a pleasant appearing individual, appropriately dressed, well hydrated, well nourished, who is alert, appropriately oriented for age, and in no acute distress with a crutch bound gait and normal affect who does not appear to be in any significant pain. Physical Exam: 
Right Knee -Decrease range of motion with flexion, Knee arc of greater than 50 degrees, Some crepitation, Grossly neurovascularly intact, Good cap refill, No skin lesion, Moderate swelling, No gross instability, Some quadriceps weakness, Kellgren and Griffin at least grade 3 Left Knee - Full Range of Motion, No crepitation, Grossly neurovascularly intact, Good cap refill, No skin lesion, No swelling, No gross instability, No quadriceps weakness Inpatient status: The patient has admitted to severe pain in the affected knee and due to such pain they are unable to complete activities of daily living at home and/or work on a regular basis where conservative treatments have failed. After extensive discussion with the patient, they have chosen to receive a total knee replacement with the expectation of inpatient procedure. Their dependent functional status (i.e. lack of capable support and safety at home, pain management, comorbities, or difficulty ambulating with assistive walking devices) would deem them a candidate for an inpatient stay. The patient acknowledges and understand the plan. The risks of surgery were explained to the patient which include but not limited to infection, nerve injury, artery injury, tendon injury, poor result, poor wound healing, unforeseen incidence, bleeding, infection, nerve damage, failure to improve, worsening of symptoms, morbidity, and mortality risks were explained. All questions were answered. Patient was told of no guarantees. Patient accepts all risks and benefits. A consent for surgery will be documented and signed by the patient or a legal guardian. All questions were answered. The procedure was explained in detail. The patient was counseled about the risks of martin Covid-19 during their perioperative period and any recovery window from their procedure. The patient was made aware that martin Covid-19 may worsen their prognosis for recovering from their procedure and lend to a higher morbidity and/or mortality risk. All material risks, benefits, and reasonable alternatives including postponing the procedure were discussed. The patient DOES wish to proceed with their procedure at this time. Encounter Diagnoses ICD-10-CM ICD-9-CM 1.  Primary osteoarthritis of right knee  M17.11 715.16  
 
 
HPI: 
 The patient is here with a chief complaint of right knee pain, sharp, throbbing pain, preop appointment, diagnosed with osteoarthritis. Pain is 10/10. Assessment/Plan: 
Plan would be for right total knee replacement with general medical clearance that she has had. We will do Dilaudid and Keflex and aspirin postoperatively. She is going to be set up for home health, and we will make sure that as well for the plan since she does not have any help at home, but it will be outpatient surgery. Return to Office: Follow-up and Dispositions · Return for already william for surgery. Scribed by Neisha Todd as dictated by Aroldo Kramer. Deanna Roper MD. Documentation True and Accepted Jarrod Roper MD

## 2020-12-29 LAB — SARS-COV-2, COV2NT: NOT DETECTED

## 2020-12-31 ENCOUNTER — ANESTHESIA EVENT (OUTPATIENT)
Dept: SURGERY | Age: 70
End: 2020-12-31
Payer: MEDICARE

## 2021-01-04 ENCOUNTER — APPOINTMENT (OUTPATIENT)
Dept: GENERAL RADIOLOGY | Age: 71
End: 2021-01-04
Attending: ORTHOPAEDIC SURGERY
Payer: MEDICARE

## 2021-01-04 ENCOUNTER — HOSPITAL ENCOUNTER (OUTPATIENT)
Age: 71
Setting detail: OBSERVATION
Discharge: HOME | End: 2021-01-04
Attending: ORTHOPAEDIC SURGERY | Admitting: ORTHOPAEDIC SURGERY
Payer: MEDICARE

## 2021-01-04 ENCOUNTER — ANESTHESIA (OUTPATIENT)
Dept: SURGERY | Age: 71
End: 2021-01-04
Payer: MEDICARE

## 2021-01-04 VITALS
WEIGHT: 165 LBS | HEIGHT: 62 IN | HEART RATE: 84 BPM | TEMPERATURE: 97.2 F | OXYGEN SATURATION: 97 % | DIASTOLIC BLOOD PRESSURE: 52 MMHG | SYSTOLIC BLOOD PRESSURE: 115 MMHG | BODY MASS INDEX: 30.36 KG/M2 | RESPIRATION RATE: 18 BRPM

## 2021-01-04 PROBLEM — M17.9 KNEE OSTEOARTHRITIS: Status: ACTIVE | Noted: 2021-01-04

## 2021-01-04 LAB
ABO + RH BLD: NORMAL
BLOOD GROUP ANTIBODIES SERPL: NEGATIVE
SPECIMEN EXP DATE BLD: NORMAL

## 2021-01-04 PROCEDURE — 64450 NJX AA&/STRD OTHER PN/BRANCH: CPT | Performed by: NURSE ANESTHETIST, CERTIFIED REGISTERED

## 2021-01-04 PROCEDURE — 86901 BLOOD TYPING SEROLOGIC RH(D): CPT

## 2021-01-04 PROCEDURE — 77030006835 HC BLD SAW SAG STRY -B: Performed by: ORTHOPAEDIC SURGERY

## 2021-01-04 PROCEDURE — 2709999900 HC NON-CHARGEABLE SUPPLY: Performed by: ORTHOPAEDIC SURGERY

## 2021-01-04 PROCEDURE — 97161 PT EVAL LOW COMPLEX 20 MIN: CPT

## 2021-01-04 PROCEDURE — C1776 JOINT DEVICE (IMPLANTABLE): HCPCS | Performed by: ORTHOPAEDIC SURGERY

## 2021-01-04 PROCEDURE — 73560 X-RAY EXAM OF KNEE 1 OR 2: CPT

## 2021-01-04 PROCEDURE — 77030029372 HC ADH SKN CLSR PRINEO J&J -C: Performed by: ORTHOPAEDIC SURGERY

## 2021-01-04 PROCEDURE — 77030041690 HC SYS PINNING KN JNJ -D: Performed by: ORTHOPAEDIC SURGERY

## 2021-01-04 PROCEDURE — 77030007866 HC KT SPN ANES BBMI -B: Performed by: NURSE ANESTHETIST, CERTIFIED REGISTERED

## 2021-01-04 PROCEDURE — 86850 RBC ANTIBODY SCREEN: CPT

## 2021-01-04 PROCEDURE — 76942 ECHO GUIDE FOR BIOPSY: CPT | Performed by: NURSE ANESTHETIST, CERTIFIED REGISTERED

## 2021-01-04 PROCEDURE — 77030002933 HC SUT MCRYL J&J -A: Performed by: ORTHOPAEDIC SURGERY

## 2021-01-04 PROCEDURE — 77030006812 HC BLD SAW RECIP STRY -B: Performed by: ORTHOPAEDIC SURGERY

## 2021-01-04 PROCEDURE — 74011250636 HC RX REV CODE- 250/636: Performed by: ORTHOPAEDIC SURGERY

## 2021-01-04 PROCEDURE — 74011250636 HC RX REV CODE- 250/636: Performed by: NURSE ANESTHETIST, CERTIFIED REGISTERED

## 2021-01-04 PROCEDURE — 74011000258 HC RX REV CODE- 258: Performed by: ORTHOPAEDIC SURGERY

## 2021-01-04 PROCEDURE — 74011000250 HC RX REV CODE- 250: Performed by: NURSE ANESTHETIST, CERTIFIED REGISTERED

## 2021-01-04 PROCEDURE — 76060000035 HC ANESTHESIA 2 TO 2.5 HR: Performed by: ORTHOPAEDIC SURGERY

## 2021-01-04 PROCEDURE — 74011000250 HC RX REV CODE- 250: Performed by: ORTHOPAEDIC SURGERY

## 2021-01-04 PROCEDURE — 99218 HC RM OBSERVATION: CPT

## 2021-01-04 PROCEDURE — 74011000258 HC RX REV CODE- 258: Performed by: NURSE ANESTHETIST, CERTIFIED REGISTERED

## 2021-01-04 PROCEDURE — 77030013708 HC HNDPC SUC IRR PULS STRY –B: Performed by: ORTHOPAEDIC SURGERY

## 2021-01-04 PROCEDURE — 77030003601 HC NDL NRV BLK BBMI -A: Performed by: NURSE ANESTHETIST, CERTIFIED REGISTERED

## 2021-01-04 PROCEDURE — 77030011266 HC ELECTRD BLD INSL COVD -A: Performed by: ORTHOPAEDIC SURGERY

## 2021-01-04 PROCEDURE — 77030002982 HC SUT POLYSRB J&J -A: Performed by: ORTHOPAEDIC SURGERY

## 2021-01-04 PROCEDURE — 76010000131 HC OR TIME 2 TO 2.5 HR: Performed by: ORTHOPAEDIC SURGERY

## 2021-01-04 PROCEDURE — 77030040375: Performed by: ORTHOPAEDIC SURGERY

## 2021-01-04 PROCEDURE — C1713 ANCHOR/SCREW BN/BN,TIS/BN: HCPCS | Performed by: ORTHOPAEDIC SURGERY

## 2021-01-04 PROCEDURE — 76210000063 HC OR PH I REC FIRST 0.5 HR: Performed by: ORTHOPAEDIC SURGERY

## 2021-01-04 PROCEDURE — 77030013079 HC BLNKT BAIR HGGR 3M -A: Performed by: NURSE ANESTHETIST, CERTIFIED REGISTERED

## 2021-01-04 PROCEDURE — 74011250637 HC RX REV CODE- 250/637: Performed by: NURSE ANESTHETIST, CERTIFIED REGISTERED

## 2021-01-04 PROCEDURE — 97116 GAIT TRAINING THERAPY: CPT

## 2021-01-04 PROCEDURE — 74011250637 HC RX REV CODE- 250/637: Performed by: ORTHOPAEDIC SURGERY

## 2021-01-04 PROCEDURE — 77030031139 HC SUT VCRL2 J&J -A: Performed by: ORTHOPAEDIC SURGERY

## 2021-01-04 PROCEDURE — 77030010783 HC BOWL MX BN CEM J&J -B: Performed by: ORTHOPAEDIC SURGERY

## 2021-01-04 PROCEDURE — 77030042022 HC SYST COLD THRPY BREG -B: Performed by: ORTHOPAEDIC SURGERY

## 2021-01-04 PROCEDURE — 77030018673: Performed by: ORTHOPAEDIC SURGERY

## 2021-01-04 PROCEDURE — 77030000032 HC CUF TRNQT ZIMM -B: Performed by: ORTHOPAEDIC SURGERY

## 2021-01-04 DEVICE — KNEE K1 TOT HEMI STD CEM IMPL CAPPED SYNTHES: Type: IMPLANTABLE DEVICE | Status: FUNCTIONAL

## 2021-01-04 DEVICE — INSERT TIB SZ 7 THK6MM KNEE POST STBL ROT PLATFRM ATTUNE: Type: IMPLANTABLE DEVICE | Site: KNEE | Status: FUNCTIONAL

## 2021-01-04 DEVICE — COMPONENT FEM SZ 7 R KNEE POST STBL CEM ATTUNE: Type: IMPLANTABLE DEVICE | Site: KNEE | Status: FUNCTIONAL

## 2021-01-04 DEVICE — COMPONENT TIB SZ 6 CEM BASE ROT PLATFRM KNEE SYS ATTUNE: Type: IMPLANTABLE DEVICE | Site: KNEE | Status: FUNCTIONAL

## 2021-01-04 DEVICE — COMPONENT PAT DIA38MM POLYETH DOME CEM MEDIALIZED ATTUNE: Type: IMPLANTABLE DEVICE | Site: KNEE | Status: FUNCTIONAL

## 2021-01-04 DEVICE — CEMENT BNE 40GM FULL DOSE PMMA W/ GENT HI VISC RADPQ LNG: Type: IMPLANTABLE DEVICE | Site: KNEE | Status: FUNCTIONAL

## 2021-01-04 RX ORDER — FENTANYL CITRATE 50 UG/ML
INJECTION, SOLUTION INTRAMUSCULAR; INTRAVENOUS AS NEEDED
Status: DISCONTINUED | OUTPATIENT
Start: 2021-01-04 | End: 2021-01-04 | Stop reason: HOSPADM

## 2021-01-04 RX ORDER — FLUMAZENIL 0.1 MG/ML
0.2 INJECTION INTRAVENOUS
Status: DISCONTINUED | OUTPATIENT
Start: 2021-01-04 | End: 2021-01-04 | Stop reason: HOSPADM

## 2021-01-04 RX ORDER — FACIAL-BODY WIPES
10 EACH TOPICAL DAILY PRN
Status: DISCONTINUED | OUTPATIENT
Start: 2021-01-04 | End: 2021-01-04 | Stop reason: HOSPADM

## 2021-01-04 RX ORDER — SODIUM CHLORIDE 0.9 % (FLUSH) 0.9 %
5-40 SYRINGE (ML) INJECTION EVERY 8 HOURS
Status: DISCONTINUED | OUTPATIENT
Start: 2021-01-04 | End: 2021-01-04 | Stop reason: HOSPADM

## 2021-01-04 RX ORDER — SODIUM CHLORIDE 0.9 % (FLUSH) 0.9 %
5-40 SYRINGE (ML) INJECTION AS NEEDED
Status: DISCONTINUED | OUTPATIENT
Start: 2021-01-04 | End: 2021-01-04 | Stop reason: HOSPADM

## 2021-01-04 RX ORDER — ASPIRIN 325 MG
325 TABLET, DELAYED RELEASE (ENTERIC COATED) ORAL 2 TIMES DAILY
Status: DISCONTINUED | OUTPATIENT
Start: 2021-01-05 | End: 2021-01-04 | Stop reason: HOSPADM

## 2021-01-04 RX ORDER — FENTANYL CITRATE 50 UG/ML
50 INJECTION, SOLUTION INTRAMUSCULAR; INTRAVENOUS
Status: DISCONTINUED | OUTPATIENT
Start: 2021-01-04 | End: 2021-01-04 | Stop reason: HOSPADM

## 2021-01-04 RX ORDER — GABAPENTIN 300 MG/1
300 CAPSULE ORAL ONCE
Status: DISCONTINUED | OUTPATIENT
Start: 2021-01-04 | End: 2021-01-04

## 2021-01-04 RX ORDER — SODIUM CHLORIDE, SODIUM LACTATE, POTASSIUM CHLORIDE, CALCIUM CHLORIDE 600; 310; 30; 20 MG/100ML; MG/100ML; MG/100ML; MG/100ML
25 INJECTION, SOLUTION INTRAVENOUS CONTINUOUS
Status: DISCONTINUED | OUTPATIENT
Start: 2021-01-04 | End: 2021-01-04 | Stop reason: HOSPADM

## 2021-01-04 RX ORDER — ONDANSETRON 2 MG/ML
4 INJECTION INTRAMUSCULAR; INTRAVENOUS
Status: DISCONTINUED | OUTPATIENT
Start: 2021-01-04 | End: 2021-01-04 | Stop reason: HOSPADM

## 2021-01-04 RX ORDER — NALOXONE HYDROCHLORIDE 0.4 MG/ML
0.4 INJECTION, SOLUTION INTRAMUSCULAR; INTRAVENOUS; SUBCUTANEOUS AS NEEDED
Status: DISCONTINUED | OUTPATIENT
Start: 2021-01-04 | End: 2021-01-04 | Stop reason: HOSPADM

## 2021-01-04 RX ORDER — BUPIVACAINE HYDROCHLORIDE 2.5 MG/ML
INJECTION, SOLUTION INFILTRATION; PERINEURAL AS NEEDED
Status: DISCONTINUED | OUTPATIENT
Start: 2021-01-04 | End: 2021-01-04 | Stop reason: HOSPADM

## 2021-01-04 RX ORDER — MIDAZOLAM HYDROCHLORIDE 1 MG/ML
INJECTION, SOLUTION INTRAMUSCULAR; INTRAVENOUS
Status: SHIPPED | OUTPATIENT
Start: 2021-01-04 | End: 2021-01-04

## 2021-01-04 RX ORDER — ONDANSETRON 2 MG/ML
4 INJECTION INTRAMUSCULAR; INTRAVENOUS ONCE
Status: DISCONTINUED | OUTPATIENT
Start: 2021-01-04 | End: 2021-01-04 | Stop reason: HOSPADM

## 2021-01-04 RX ORDER — EPHEDRINE SULFATE/0.9% NACL/PF 50 MG/5 ML
SYRINGE (ML) INTRAVENOUS AS NEEDED
Status: DISCONTINUED | OUTPATIENT
Start: 2021-01-04 | End: 2021-01-04 | Stop reason: HOSPADM

## 2021-01-04 RX ORDER — BUPIVACAINE HYDROCHLORIDE 2.5 MG/ML
INJECTION, SOLUTION EPIDURAL; INFILTRATION; INTRACAUDAL
Status: SHIPPED | OUTPATIENT
Start: 2021-01-04 | End: 2021-01-04

## 2021-01-04 RX ORDER — DEXTROSE 50 % IN WATER (D50W) INTRAVENOUS SYRINGE
25-50 AS NEEDED
Status: DISCONTINUED | OUTPATIENT
Start: 2021-01-04 | End: 2021-01-04 | Stop reason: HOSPADM

## 2021-01-04 RX ORDER — HYDROMORPHONE HYDROCHLORIDE 2 MG/1
2 TABLET ORAL
Status: DISCONTINUED | OUTPATIENT
Start: 2021-01-04 | End: 2021-01-04 | Stop reason: HOSPADM

## 2021-01-04 RX ORDER — DIPHENHYDRAMINE HYDROCHLORIDE 50 MG/ML
12.5 INJECTION, SOLUTION INTRAMUSCULAR; INTRAVENOUS
Status: DISCONTINUED | OUTPATIENT
Start: 2021-01-04 | End: 2021-01-04 | Stop reason: HOSPADM

## 2021-01-04 RX ORDER — BUPIVACAINE HYDROCHLORIDE 7.5 MG/ML
INJECTION, SOLUTION INTRASPINAL
Status: SHIPPED | OUTPATIENT
Start: 2021-01-04 | End: 2021-01-04

## 2021-01-04 RX ORDER — NALOXONE HYDROCHLORIDE 0.4 MG/ML
0.2 INJECTION, SOLUTION INTRAMUSCULAR; INTRAVENOUS; SUBCUTANEOUS AS NEEDED
Status: DISCONTINUED | OUTPATIENT
Start: 2021-01-04 | End: 2021-01-04 | Stop reason: HOSPADM

## 2021-01-04 RX ORDER — MAGNESIUM SULFATE 100 %
4 CRYSTALS MISCELLANEOUS AS NEEDED
Status: DISCONTINUED | OUTPATIENT
Start: 2021-01-04 | End: 2021-01-04 | Stop reason: HOSPADM

## 2021-01-04 RX ORDER — GABAPENTIN 300 MG/1
300 CAPSULE ORAL ONCE
Status: COMPLETED | OUTPATIENT
Start: 2021-01-04 | End: 2021-01-04

## 2021-01-04 RX ORDER — SENNOSIDES 8.6 MG/1
1 TABLET ORAL 2 TIMES DAILY
Status: DISCONTINUED | OUTPATIENT
Start: 2021-01-04 | End: 2021-01-04 | Stop reason: HOSPADM

## 2021-01-04 RX ORDER — ALBUTEROL SULFATE 0.83 MG/ML
2.5 SOLUTION RESPIRATORY (INHALATION)
Status: DISCONTINUED | OUTPATIENT
Start: 2021-01-04 | End: 2021-01-04 | Stop reason: HOSPADM

## 2021-01-04 RX ORDER — FENTANYL CITRATE 50 UG/ML
50 INJECTION, SOLUTION INTRAMUSCULAR; INTRAVENOUS AS NEEDED
Status: DISCONTINUED | OUTPATIENT
Start: 2021-01-04 | End: 2021-01-04 | Stop reason: HOSPADM

## 2021-01-04 RX ORDER — ONDANSETRON 2 MG/ML
INJECTION INTRAMUSCULAR; INTRAVENOUS AS NEEDED
Status: DISCONTINUED | OUTPATIENT
Start: 2021-01-04 | End: 2021-01-04 | Stop reason: HOSPADM

## 2021-01-04 RX ORDER — PROPOFOL 10 MG/ML
INJECTION, EMULSION INTRAVENOUS AS NEEDED
Status: DISCONTINUED | OUTPATIENT
Start: 2021-01-04 | End: 2021-01-04 | Stop reason: HOSPADM

## 2021-01-04 RX ORDER — DEXAMETHASONE SODIUM PHOSPHATE 4 MG/ML
INJECTION, SOLUTION INTRA-ARTICULAR; INTRALESIONAL; INTRAMUSCULAR; INTRAVENOUS; SOFT TISSUE
Status: SHIPPED | OUTPATIENT
Start: 2021-01-04 | End: 2021-01-04

## 2021-01-04 RX ORDER — KETAMINE HCL IN 0.9 % NACL 50 MG/5 ML
SYRINGE (ML) INTRAVENOUS AS NEEDED
Status: DISCONTINUED | OUTPATIENT
Start: 2021-01-04 | End: 2021-01-04 | Stop reason: HOSPADM

## 2021-01-04 RX ORDER — CHOLECALCIFEROL (VITAMIN D3) 125 MCG
2000 CAPSULE ORAL DAILY
COMMUNITY

## 2021-01-04 RX ORDER — CELECOXIB 200 MG/1
400 CAPSULE ORAL ONCE
Status: COMPLETED | OUTPATIENT
Start: 2021-01-04 | End: 2021-01-04

## 2021-01-04 RX ADMIN — TRANEXAMIC ACID 1 G: 1 INJECTION, SOLUTION INTRAVENOUS at 09:41

## 2021-01-04 RX ADMIN — SODIUM CHLORIDE, POTASSIUM CHLORIDE, SODIUM LACTATE AND CALCIUM CHLORIDE 25 ML/HR: 600; 310; 30; 20 INJECTION, SOLUTION INTRAVENOUS at 08:15

## 2021-01-04 RX ADMIN — PROPOFOL 50 MG: 10 INJECTION, EMULSION INTRAVENOUS at 09:50

## 2021-01-04 RX ADMIN — Medication 20 MG: at 10:13

## 2021-01-04 RX ADMIN — HYDROMORPHONE HYDROCHLORIDE 2 MG: 2 TABLET ORAL at 15:37

## 2021-01-04 RX ADMIN — PROPOFOL 50 MG: 10 INJECTION, EMULSION INTRAVENOUS at 10:32

## 2021-01-04 RX ADMIN — GABAPENTIN 300 MG: 300 CAPSULE ORAL at 08:31

## 2021-01-04 RX ADMIN — PROPOFOL 100 MG: 10 INJECTION, EMULSION INTRAVENOUS at 10:47

## 2021-01-04 RX ADMIN — PROPOFOL 50 MG: 10 INJECTION, EMULSION INTRAVENOUS at 09:20

## 2021-01-04 RX ADMIN — TRANEXAMIC ACID 1 G: 1 INJECTION, SOLUTION INTRAVENOUS at 10:16

## 2021-01-04 RX ADMIN — MIDAZOLAM 2 MG: 1 INJECTION INTRAMUSCULAR; INTRAVENOUS at 08:58

## 2021-01-04 RX ADMIN — FENTANYL CITRATE 100 MCG: 50 INJECTION, SOLUTION INTRAMUSCULAR; INTRAVENOUS at 09:13

## 2021-01-04 RX ADMIN — BUPIVACAINE HYDROCHLORIDE 25 ML: 2.5 INJECTION, SOLUTION EPIDURAL; INFILTRATION; INTRACAUDAL at 08:58

## 2021-01-04 RX ADMIN — BUPIVACAINE HYDROCHLORIDE IN DEXTROSE 5 MG: 7.5 INJECTION, SOLUTION SUBARACHNOID at 09:40

## 2021-01-04 RX ADMIN — CELECOXIB 400 MG: 200 CAPSULE ORAL at 08:39

## 2021-01-04 RX ADMIN — Medication 10 MG: at 10:32

## 2021-01-04 RX ADMIN — CEFAZOLIN SODIUM 2 G: 1 INJECTION, POWDER, FOR SOLUTION INTRAMUSCULAR; INTRAVENOUS at 09:13

## 2021-01-04 RX ADMIN — PROPOFOL 50 MG: 10 INJECTION, EMULSION INTRAVENOUS at 10:12

## 2021-01-04 RX ADMIN — Medication 12.5 MG: at 09:52

## 2021-01-04 RX ADMIN — ONDANSETRON HYDROCHLORIDE 4 MG: 2 INJECTION, SOLUTION INTRAMUSCULAR; INTRAVENOUS at 09:20

## 2021-01-04 RX ADMIN — Medication 20 MG: at 09:20

## 2021-01-04 RX ADMIN — DEXAMETHASONE SODIUM PHOSPHATE 4 MG: 4 INJECTION, SOLUTION INTRAMUSCULAR; INTRAVENOUS at 08:58

## 2021-01-04 RX ADMIN — PROPOFOL 100 MG: 10 INJECTION, EMULSION INTRAVENOUS at 10:55

## 2021-01-04 NOTE — ANESTHESIA POSTPROCEDURE EVALUATION
Procedure(s):  RIGHT TKA (STANDARD) (OUTPATIENT). spinal, general - backup, regional    Anesthesia Post Evaluation      Multimodal analgesia: multimodal analgesia used between 6 hours prior to anesthesia start to PACU discharge  Patient location during evaluation: bedside  Patient participation: complete - patient cannot participate  Level of consciousness: awake and alert  Pain management: adequate  Airway patency: patent  Anesthetic complications: no  Cardiovascular status: stable  Respiratory status: acceptable  Hydration status: acceptable  Comments: DC when criteria met.   Post anesthesia nausea and vomiting:  none  Final Post Anesthesia Temperature Assessment:  Normothermia (36.0-37.5 degrees C)      INITIAL Post-op Vital signs:   Vitals Value Taken Time   BP     Temp 36.5 °C (97.7 °F) 01/04/21 1131   Pulse 76 01/04/21 1131   Resp 16 01/04/21 1131   SpO2 96 % 01/04/21 1131

## 2021-01-04 NOTE — ANESTHESIA PROCEDURE NOTES
Peripheral Block    Start time: 1/4/2021 8:50 AM  End time: 1/4/2021 8:58 AM  Performed by: Riana Lomax CRNA  Authorized by: Riana Lomax CRNA       Pre-procedure: Indications: procedure for pain    Preanesthetic Checklist: patient identified, risks and benefits discussed, site marked, timeout performed, anesthesia consent given and patient being monitored    Timeout Time: 08:50          Block Type:   Block Type:   Adductor canal  Laterality:  Right  Monitoring:  Standard ASA monitoring, responsive to questions, oxygen, continuous pulse ox, frequent vital sign checks and heart rate  Injection Technique:  Single shot  Procedures: ultrasound guided    Patient Position: supine  Prep: chlorhexidine    Location:  Mid thigh  Needle Type:  Ultraplex  Needle Gauge:  22 G  Needle Localization:  Ultrasound guidance  Medication Injected:  Midazolam (VERSED) injection, 2 mg  bupivacaine 0.25%, 25 mL  dexamethasone (DECADRON) 4 mg/mL injection, 4 mg  Med Admin Time: 1/4/2021 8:58 AM    Assessment:  Number of attempts:  1  Injection Assessment:  Incremental injection every 5 mL, local visualized surrounding nerve on ultrasound, no paresthesia, negative aspiration for blood and no intravascular symptoms  Patient tolerance:  Patient tolerated the procedure well with no immediate complications

## 2021-01-04 NOTE — PROGRESS NOTES
Problem: Mobility Impaired (Adult and Pediatric)  Goal: *Acute Goals and Plan of Care (Insert Text)  Description: Pt able to ambulate with MOD (I) with a RW and with crutches. She navigates stairs with SBA. PLOF: Pt was a limited community ambulator who used crutches and who was (I) with ADLs. Outcome: Resolved/Met   PHYSICAL THERAPY EVALUATION AND DISCHARGE    Patient: Bel Ivy (13 y.o. female)  Date: 1/4/2021  Primary Diagnosis: Primary osteoarthritis of right knee [M17.11]  Knee osteoarthritis [M17.10]  Procedure(s) (LRB):  RIGHT TKA (STANDARD) (OUTPATIENT) (Right) Day of Surgery   Precautions:   WBAT    ASSESSMENT :  Based on the objective data described below, the patient presents s/p R TKA and she is WBAT. She ambulates well with a RW and with crutches, she is unable to use a cane as it is uncomfortable on her R LE. She navigates stairs well with 1 rail with SBA. She can return home with home health. Patient does not require further skilled intervention at this level of care. PLAN :  Recommendations and Planned Interventions:   No formal PT needs identified at this time. Discharge Recommendations: Home Health  Further Equipment Recommendations for Discharge: N/A     SUBJECTIVE:   Patient stated i want to get up.     OBJECTIVE DATA SUMMARY:     Past Medical History:   Diagnosis Date    Anginal pain ([address]     Chronic obstructive pulmonary disease (HCC)     GERD (gastroesophageal reflux disease)     Hypertension     Lung disease     Thyroid condition     enlarged    Tumors     fatty tissue     Past Surgical History:   Procedure Laterality Date    HX APPENDECTOMY      HX BACK SURGERY  06/01/2016    L4/5 Lami Fusion    HX BREAST REDUCTION      HX CERVICAL FUSION  1990    ACDF    HX CHOLECYSTECTOMY      HX HEENT      HX HERNIA REPAIR      hiatal hernia    HX HYSTERECTOMY      HX MOHS PROCEDURES Right     HX TONSIL AND ADENOIDECTOMY      [place] PLASTIC SURGERY, NECK  1990    ACDF by Dr. Al Cisse Barriers to Learning/Limitations: None  Compensate with: N/A  Home Situation:   Home Situation  Home Environment: Private residence  # Steps to Enter: 4  Rails to Enter: Yes  Hand Rails : Bilateral  One/Two Story Residence: One story  Living Alone: No  Support Systems: Spouse/Significant Other/Partner  Patient Expects to be Discharged to[de-identified] Private residence  Current DME Used/Available at Home: luciano Manley  Critical Behavior:  Neurologic State: Alert  Orientation Level: Oriented X4     Skin Integrity: Incision (comment)(right knee)  Skin Integumentary  Skin Integrity: Incision (comment)(right knee)     Strength:    Strength: Generally decreased, functional(R knee 4/5; SLR at 1453, 5.25 hours after spinal)    RUE:5/5  LUE:5/5  RLE:4/5  LLE:5/5  Tone & Sensation:   Sensation: Impaired(Buttocks still numb)    Range Of Motion:   AROM: Generally decreased, functional(R knee: )     PROM: Generally decreased, functional(R knee: )    RUE: Pt lacks 50% shoulder motion  LUE: Pt lacks 50% shoulder motion  Posture:  Posture (WDL): Within defined limits      Functional Mobility:  Bed Mobility:  Rolling: Modified independent  Supine to Sit: Modified independent     Scooting: Modified independent  Transfers:  Sit to Stand: Contact guard assistance;Modified independent  Stand to Sit: Contact guard assistance;Modified independent    Balance:   Sitting: Intact  Standing: Impaired  Standing - Static: Good;Fair  Standing - Dynamic : Good;Fair(Progress to normal by end of session)  Ambulation/Gait Training:  Distance (ft): 200 Feet (ft)(and another 25' and 140')  Assistive Device: Walker, rolling;Crutches;Cane, straight(Pt tries to use cane but is uncomfortable using it)  Ambulation - Level of Assistance: Contact guard assistance;Modified independent     Gait Description (WDL): Exceptions to WDL     Right Side Weight Bearing: As tolerated     Stairs:  Number of Stairs Trained: 4  Stairs - Level of Assistance: Stand-by assistance  Rail Use: Left     Today's TX:   Pt performing gait and stair navigation as noted above. She is educated on a HEP and tolerates well. Pain:  Pain level pre-treatment: 4/10   Pain level post-treatment: 7/10  Pain Location: R knee  Pain Intervention(s): Medication (see MAR); Rest  Response to intervention: Nurse notified    Activity Tolerance:   Good  Please refer to the flowsheet for vital signs taken during this treatment. After treatment:   [x]         Patient left in no apparent distress sitting up in chair  []         Patient left in no apparent distress in bed  [x]         Call bell left within reach  [x]         Nursing notified  []         Caregiver present  []         Bed alarm activated  []         SCDs applied    COMMUNICATION/EDUCATION:   [x]         Role of Physical Therapy in the acute care setting. []         Fall prevention education was provided and the patient/caregiver indicated understanding. []         Patient/family have participated as able in goal setting and plan of care. []         Patient/family agree to work toward stated goals and plan of care. []         Patient understands intent and goals of therapy, but is neutral about his/her participation. []         Patient is unable to participate in goal setting/plan of care: ongoing with therapy staff.  []         Other:     Thank you for this referral.  Saleem Haddad, PT, DPT   Time Calculation: 36 mins

## 2021-01-04 NOTE — PROGRESS NOTES
Patient states she does not have an allergy to morphine but percocet instead which she reports causes itching all over. Notified pharmacy.

## 2021-01-04 NOTE — OP NOTES
Operative Note    Patient: Pati Lee MRN: [id number]  Surgery Date: 1/4/2021  [unfilled]          Procedure  Primary Surgeon    RIGHT TKA (STANDARD) (OUTPATIENT)  Daisy Horton MD    * Panel 2 does not exist *  * Panel 2 does not exist *    * Panel 3 does not exist *  * Panel 3 does not exist *     Surgeon(s) and Role:     * Daisy Horton MD - Primary    Other OR Staff/Assistants:  Circ-1: Elton Knock  Scrub Tech-1: Taras Ly  Surg Asst-1: Coral negron Assistant Tasks:  Closing    Pre-operative Diagnosis:  Primary osteoarthritis of right knee [M17.11]    Post-operative Diagnosis: same as preop diagnosis    Anesthesia Type: General     Findings: djd    Complications: No    EBL: 50 cc    Specimens: None    Implants     Implant    Scr Spne 5.5 Expedium 7x55mm -- - [id number] - Implanted  Spine Lumbar    Inventory item: SCREW SPNL L55MM DIA7MM TI SGL INNR POLYAX FOR 5.5MM ABHI Model/Cat number: [id number]    : Light Extraction SPINE Lot number: N/A    As of 6/1/2016     Status: Implanted                  Abhi Pre-Lordosed W/Lne 40mm -- Expedium Ti - [id number] - Implanted  Spine Lumbar    Inventory item: ABHI SPNL L40MM DIA5. 5MM TI PRELORDOSED MEDIALIZED POST Model/Cat number: [id number]    : Accellion Ne [employer] Lot number: N/A    As of 6/1/2016     Status: Implanted                  Scr Set Spne Sgl 5.5 Xped Ti -- - [id number] - Implanted  Spine Lumbar    Inventory item: SET SCR SPNL L25MM DIA5. 5MM TI FOR 5.5MM ABHI EXPEDIUM Model/Cat number: [id number]    : Accellion Ne [employer] [place] Lot number: N/A    As of 6/1/2016     Status: Implanted                  Cement Bne 40gm Full Dose Pmma W/ Gent Hi Visc Radpq Lng - [id number] - Implanted   (Right) Knee    Inventory item: CEMENT BNE 40GM FULL DOSE PMMA W/ GENT HI VISC RADPQ LNG Model/Cat number: [id number]    : Light Extraction [employer] Lot number: [id number]    Size: 40g      As of 1/4/2021 Status: Implanted                  Base Tib Rp Sz6 Sam -- Attune - [id number] - Implanted   (Right) Knee    Inventory item: BASE TIB RP SZ6 SAM -- ATTUNE Model/Cat number: [id number]    : [employer] Lot number: [id number]    Size: 6      As of 1/4/2021     Status: Implanted                  Insert Tib Sz 7 Thk6mm Knee Post Stbl Rot Platfrm Attune - [id number] - Implanted   (Right) Knee    Inventory item: INSERT TIB SZ 7 THK6MM KNEE POST STBL ROT PLATFRM ATTUNE Model/Cat number: [id number]    : [employer] Lot number: [id number]    Size: 7 6mm      As of 1/4/2021     Status: Implanted                  Component Fem Sz 7 R Knee Post Stbl Sam Attune - [id number] - Implanted   (Right) Knee    Inventory item: COMPONENT FEM SZ 7 R KNEE POST STBL SAM ATTUNE Model/Cat number: [id number]    : [employer] Lot number: [id number]    Size: 7 right      As of 1/4/2021     Status: Implanted                  Component Pat Srf19ak Polyeth Dome Sam Medialized Attune - [id number] - Implanted   (Right) Knee    Inventory item: COMPONENT PAT APA29OE POLYETH DOME SAM MEDIALIZED ATTUNE Model/Cat number: [id number]    : [employer] Lot number: [id number]    Size: 38mm      As of 1/4/2021     Status: Implanted                         OPERATIVE PROCEDURE:  Please note the first assistant role was to help in patient positioning and draping of the extremity in a sterile fashion. Also during the surgery the assistant's responsibilities included but not limited to extremity positioning during critical portions of the surgery. Assisting in using and placement of retractors during surgery. Lower extremity was prepped and draped in a sterile fashion. After adequate anesthesia was given, the patient was placed in a well-padded supine position.   Subvastus arthrotomy from the tibial tubercle to the superior pole of the patella was made. Knee was hyperflexed. Intramedullary reaming of distal femur and proximal tibia was performed. 10 mm of distal femur was cut. Anterior-posterior sizing guide was used. Anterior, posterior, chamfer cuts, and box cuts were made next. Proximal tibial cut and preparation performed. Posterior osteophyte meniscal remnants were removed, and also patella was everted. Free-hand cut of the patella was made. Trial components were placed. The patient was found to have excellent range of motion and stability with all trial components. All the trial components removed. Copious irrigation performed. Distal femur, proximal tibia, and patella were impacted in place. Excessive cement was removed. After the cement was hard, Subvastus arthrotomy closed with Vicryl and Prineo stitch. Compressive dressing was applied. The patient was taken to PACU in stable condition.       Andre Gonzalez MD

## 2021-01-04 NOTE — PROGRESS NOTES
Patients IV discontinued at this time. Instructions explained to patient. Patient asking a lot of questions. Patient discharged home with IS and [facility] care. Sunday Hikes down with patient to explain instructions to son.

## 2021-01-04 NOTE — INTERVAL H&P NOTE
Update History & Physical 
 
The Patient's History and Physical was reviewed with the patient. There was no change. The surgical site was confirmed by the patient and me. Plan:  The risk, benefits, expected outcome, and alternative to the recommended procedure have been discussed with the patient. Patient understands and wants to proceed with the procedure.  
 
 
Electronically signed by Lynn Romero MD on 1/4/2021 at 8:20 AM

## 2021-01-04 NOTE — DISCHARGE INSTRUCTIONS
TOTAL KNEE REPLACEMENT DISCHARGE INFORMATION    You have undergone a Total Knee Replacement. The following list is to provide you with some expectations over the next week upon your discharge from the hospital.     1. Please continue your Aspirin 325mg every 12 hours (twice daily) or any other blood thinner as directed until Dr. Kade Sheehan instructs you to discontinue it. If you are not sure which blood thinner to take please contact Dr. Debra Mcmullen office next business day for clarification. 2. Please be sure to continue your thigh-high compression stockings on both sides until instructed to discontinue them. 3. DO NOT GET THE INCISION WET until instructed to do so. 4. The Ace wrap applied on your knee may be removed 48 hours after your surgery. We will leave the stockings on.  5. During the next week, should you experience fevers of 101.5 F, a white drainage from the incision, extreme redness around the incision, or the incision begins to have a pungent smell; Please call our office or page Dr. Kade Sheehan whose numbers are provided in your discharge paperwork. To Page Dr. Kade Sheehan please call [phone] and dial 0. Have the  page whomever is on call for Orthopedics. These are signs of infection and it should be addressed immediately. 6. Please do not drive until instructed to do so.  7. It is very important for you to begin your Outpatient Physical Therapy within a couple days of the day of your discharge and your appointment should have been set up. If your physical therapy has not been set up please call our office the next business day for assistance. Details provided in a separate sheet. 8. Finish all antibiotics. Start the antibiotics as soon as you go home if you have prescribed antibiotics. 9.  You should perform your daily home exercises at least 4 times a day 30 minutes each time. 10.  Do not place anything under your knee while sleeping at night.   Elevate your heel so your knee  is straight while sleeping at night. 11.  Perform deep breathing exercises 10 times every hour while awake. Use your Incentive Spirometer. 12.  If you had a nerve block and you are not having pain the day of the surgery, at nighttime it is okay to take 1 pain medication before going to sleep to help prevent excruciating pain when the nerve block wears off. Please eat something before taking pain medicine to prevent stomach upset. 13.  You will be given an ice machine to use to help prevent swelling. Do not apply heat to the incision area. See polar care instructions below. 14.  While you are awake at least 10 times every 30 minutes move your foot up and down as if you are pumping gas from both feet to help prevent swelling and to promote blood circulation in the calf. 15.  If you develop sudden onset of shortness of breath or severe calf pain please go to closest emergency room. 16. Your pain medicine is a Narcotic and may cause constipation. You may take an over the counter stool softener while taking pain medicine. POLAR CARE INSTRUCTIONS:     DAY 1-3 WEAR CONTINUOUSLY WHILE AWAKE.  INSPECT SKIN EVERY 1-2 HOURS   MAKE SURE YOU PLACE A TOWEL IN BETWEEN SKIN AND POLAR PAD.  DAY 4 AND ON USE AS NEEDED FOR PAIN CONTROL FOR 1 HOUR INTERVALS; NOT TO EXCEED 12 HOURS/DAY. Things to watch for:             Increased swelling of the surgical site             Spreading of redness around the incision site             Drainage of pus from the incision site             Developing a fever of 101.5 °F or higher             If any of these symptoms occur you have any questions please contact our office at [phone]. If you need to talk to Dr. Tarsha Wallace on an urgent basis please call the hospital at [phone]. 0 for the . Please let the  know you are a surgical patient of Dr. Tarsha Wallace and you wish to get in contact with him. If Dr. Tarsha Wallace or his staff do not call you back within 30 minutes.   Please tell the  to try again. Phone: [phone]  www. [employer]

## 2021-01-04 NOTE — PROGRESS NOTES
Spoke with patient about her Dilaudid.  States she has never had Dilaudid and has never taken Morphine and does not know how that got on her allergy list.

## 2021-01-04 NOTE — ANESTHESIA PROCEDURE NOTES
Spinal Block    Start time: 1/4/2021 9:20 AM  End time: 1/4/2021 9:40 AM  Performed by: Adam King CRNA  Authorized by: Adam King CRNA     Pre-procedure:   Indications: primary anesthetic  Preanesthetic Checklist: patient identified, risks and benefits discussed, anesthesia consent, site marked, patient being monitored and timeout performed    Timeout Time: 09:20          Spinal Block:   Patient Position:  Seated  Prep Region:  Lumbar  Prep: Betadine      Location:  L2-3  Technique:  Single shot    Local Dose (mL):  2    Needle:   Needle Type:  Pencan  Needle Gauge:  22 G  Attempts:  2      Events: CSF confirmed, no blood with aspiration, no paresthesia and paresthesia        Assessment:  Insertion:  Uncomplicated  Patient tolerance:  Patient tolerated the procedure well with no immediate complications

## 2021-01-04 NOTE — PERIOP NOTES
TRANSFER - OUT REPORT:    Verbal report given to Joseph More RN(name) on Na Geller  being transferred to [facility](unit) for routine post - op       Report consisted of patients Situation, Background, Assessment and   Recommendations(SBAR). Information from the following report(s) SBAR, OR Summary, Intake/Output and MAR was reviewed with the receiving nurse. Lines:   Peripheral IV 01/04/21 Left Antecubital (Active)   Site Assessment Clean, dry, & intact 01/04/21 1130   Phlebitis Assessment 0 01/04/21 1130   Infiltration Assessment 0 01/04/21 1130   Dressing Status Clean, dry, & intact 01/04/21 1130   Dressing Type Transparent 01/04/21 1130   Hub Color/Line Status Pink; Infusing 01/04/21 1130   Alcohol Cap Used No 01/04/21 1130        Opportunity for questions and clarification was provided.       Patient transported with:   Registered Nurse     Visit Vitals  BP (!) 116/53 (BP 1 Location: Right arm, BP Patient Position: At rest;Supine)   Pulse 71   Temp 97.2 °F (36.2 °C)   Resp 14   Ht 5' 2\" (1.575 m)   Wt 74.8 kg (165 lb)   SpO2 95%   BMI 30.18 kg/m²

## 2021-01-06 ENCOUNTER — TELEPHONE (OUTPATIENT)
Dept: ORTHOPEDIC SURGERY | Age: 71
End: 2021-01-06

## 2021-01-06 NOTE — TELEPHONE ENCOUNTER
RT TKR ON Monday SHE HAS NOT HEARD FROM HOME [facility] FOR [facility] AND DOES NOT HAVE ANY INFORMATION ABOUT THEM TO CONTACT THEM AND DOES NOT KNOW WHAT TO DO

## 2021-01-06 NOTE — TELEPHONE ENCOUNTER
The first Home [facility] that was scheduled could not see patient because if staffing issues.  I have faxed them to another company

## 2021-01-11 ENCOUNTER — TELEPHONE (OUTPATIENT)
Dept: ORTHOPEDIC SURGERY | Age: 71
End: 2021-01-11

## 2021-01-11 DIAGNOSIS — M25.561 ACUTE PAIN OF RIGHT KNEE: Primary | ICD-10-CM

## 2021-01-11 RX ORDER — HYDROMORPHONE HYDROCHLORIDE 2 MG/1
2 TABLET ORAL
Qty: 30 TAB | Refills: 0 | Status: SHIPPED | OUTPATIENT
Start: 2021-01-11 | End: 2021-01-25

## 2021-01-12 ENCOUNTER — VIRTUAL VISIT (OUTPATIENT)
Dept: ORTHOPEDIC SURGERY | Age: 71
End: 2021-01-12
Payer: MEDICARE

## 2021-01-12 DIAGNOSIS — M17.11 OSTEOARTHRITIS OF RIGHT KNEE, UNSPECIFIED OSTEOARTHRITIS TYPE: Primary | ICD-10-CM

## 2021-01-12 DIAGNOSIS — M25.561 RIGHT KNEE PAIN, UNSPECIFIED CHRONICITY: ICD-10-CM

## 2021-01-12 PROCEDURE — 99024 POSTOP FOLLOW-UP VISIT: CPT | Performed by: ORTHOPAEDIC SURGERY

## 2021-01-12 NOTE — PATIENT INSTRUCTIONS
Knee Pain or Injury: Care Instructions Your Care Instructions Injuries are a common cause of knee problems. Sudden (acute) injuries may be caused by a direct blow to the knee. They can also be caused by abnormal twisting, bending, or falling on the knee. Pain, bruising, or swelling may be severe, and may start within minutes of the injury. Overuse is another cause of knee pain. Other causes are climbing stairs, kneeling, and other activities that use the knee. Everyday wear and tear, especially as you get older, also can cause knee pain. Rest, along with home treatment, often relieves pain and allows your knee to heal. If you have a serious knee injury, you may need tests and treatment. Follow-up care is a key part of your treatment and safety. Be sure to make and go to all appointments, and call your doctor if you are having problems. It's also a good idea to know your test results and keep a list of the medicines you take. How can you care for yourself at home? · Be safe with medicines. Read and follow all instructions on the label. ? If the doctor gave you a prescription medicine for pain, take it as prescribed. ? If you are not taking a prescription pain medicine, ask your doctor if you can take an over-the-counter medicine. · Rest and protect your knee. Take a break from any activity that may cause pain. · Put ice or a cold pack on your knee for 10 to 20 minutes at a time. Put a thin cloth between the ice and your skin. · Prop up a sore knee on a pillow when you ice it or anytime you sit or lie down for the next 3 days. Try to keep it above the level of your heart. This will help reduce swelling. · If your knee is not swollen, you can put moist heat, a heating pad, or a warm cloth on your knee. · If your doctor recommends an elastic bandage, sleeve, or other type of support for your knee, wear it as directed. · Follow your doctor's instructions about how much weight you can put on your leg. Use a cane, crutches, or a walker as instructed. · Follow your doctor's instructions about activity during your healing process. If you can do mild exercise, slowly increase your activity. · Reach and stay at a healthy weight. Extra weight can strain the joints, especially the knees and hips, and make the pain worse. Losing even a few pounds may help. When should you call for help? Call 911 anytime you think you may need emergency care. For example, call if: 
  · You have symptoms of a blood clot in your lung (called a pulmonary embolism). These may include: 
? Sudden chest pain. ? Trouble breathing. ? Coughing up blood. Call your doctor now or seek immediate medical care if: 
  · You have severe or increasing pain.  
  · Your leg or foot turns cold or changes color.  
  · You cannot stand or put weight on your knee.  
  · Your knee looks twisted or bent out of shape.  
  · You cannot move your knee.  
  · You have signs of infection, such as: 
? Increased pain, swelling, warmth, or redness. ? Red streaks leading from the knee. ? Pus draining from a place on your knee. ? A fever.  
  · You have signs of a blood clot in your leg (called a deep vein thrombosis), such as: 
? Pain in your calf, back of the knee, thigh, or groin. ? Redness and swelling in your leg or groin. Watch closely for changes in your health, and be sure to contact your doctor if: 
  · You have tingling, weakness, or numbness in your knee.  
  · You have any new symptoms, such as swelling.  
  · You have bruises from a knee injury that last longer than 2 weeks.  
  · You do not get better as expected. Where can you learn more? Go to http://www.gray.com/ Enter K195 in the search box to learn more about \"Knee Pain or Injury: Care Instructions. \" Current as of: June 26, 2019               Content Version: 12.6 © 9661-5881 [employer]. Care instructions adapted under license by [employer] (which disclaims liability or warranty for this information). If you have questions about a medical condition or this instruction, always ask your healthcare professional. [address] any warranty or liability for your use of this information.

## 2021-01-12 NOTE — PROGRESS NOTES
Name: John Morgan    : 1950     Service Dept: [facility]    Patient's Pharmacies:    [facility] [address] [place] [address]  Phone: [phone] Fax: [phone]       Chief Complaint   Patient presents with    Knee Pain        There were no vitals taken for this visit. Allergies   Allergen Reactions    Cortisporin [Neomycin-Bacitracin-Poly-Hc] Other (comments)     tininitus    Morphine Other (comments)     Not allergic    Neomycin-Polymyxin-Hc Other (comments)    Oxycodone-Acetaminophen Itching    Oxycodone-Aspirin Other (comments)     Patient denies allergy        Current Outpatient Medications   Medication Sig Dispense Refill    HYDROmorphone (Dilaudid) 2 mg tablet Take 1 Tab by mouth every four to six (4-6) hours as needed for Pain for up to 14 days. Max Daily Amount: 12 mg. 30 Tab 0    potassium 99 mg tablet Take 99 mg by mouth daily.  cholecalciferol, vitamin D3, (Vitamin D3) 50 mcg (2,000 unit) tab Take 2,000 Units by mouth daily.  Ferrous Fumarate 325 mg (106 mg iron) tab Take 325 mg by mouth daily.  hydrocortisone (HYTONE) 2.5 % topical cream Apply  to affected area.  methIMAzole (TAPAZOLE) 5 mg tablet Take 5 mg by mouth daily.  HYDROcodone-acetaminophen (NORCO) 7.5-325 mg per tablet Take 1 Tab by mouth every eight (8) hours as needed for Pain. Max Daily Amount: 3 Tabs. 90 Tab 0    gabapentin (NEURONTIN) 300 mg capsule Take 1 Cap by mouth three (3) times daily. 90 Cap 3    naproxen (NAPROSYN) 500 mg tablet Take 1 Tab by mouth two (2) times daily (with meals). 60 Tab 3    atorvastatin (LIPITOR) 40 mg tablet Take 20 mg by mouth daily.  metoprolol tartrate (LOPRESSOR) 25 mg tablet Take 25 mg by mouth two (2) times a day.  meloxicam (MOBIC) 15 mg tablet Take 15 mg by mouth daily.       albuterol (PROVENTIL HFA, VENTOLIN HFA, PROAIR HFA) 90 mcg/actuation inhaler Take 2 Puffs by inhalation every four (4) hours as needed for Wheezing.  tiotropium (SPIRIVA) 18 mcg inhalation capsule Take 1 Cap by inhalation daily.  beclomethasone (QVAR) 80 mcg/actuation inhaler Take 1 Puff by inhalation two (2) times a day.  nitroglycerin (NITROSTAT) 0.4 mg SL tablet by SubLINGual route every five (5) minutes as needed for Chest Pain.  lisinopril-hydrochlorothiazide (PRINZIDE, ZESTORETIC) 10-12.5 mg per tablet Take  by mouth daily.  omeprazole (PRILOSEC) 20 mg capsule Take 40 mg by mouth daily.  ALPRAZolam (XANAX) 0.25 mg tablet Take 0.5 mg by mouth three (3) times daily (with meals).  traMADol (ULTRAM) 50 mg tablet Take 50 mg by mouth three (3) times daily.  aspirin delayed-release 81 mg tablet Take  by mouth daily.  CALCIUM CARBONATE/VITAMIN D3 (CALCIUM 600 WITH VITAMIN D3 PO) Take  by mouth three (3) times daily.  acetaminophen (TYLENOL) 500 mg tablet Take 1,000 mg by mouth three (3) times daily.           Patient Active Problem List   Diagnosis Code    Lumbago M54.5    Spondylisthesis M43.10    Spinal stenosis of lumbar region at multiple levels M48.061    Lumbar spinal stenosis M48.061    S/P lumbar fusion Z98.1    Cervicogenic headache R51.9    Occipital neuralgia of right side M54.81    Spondylosis of cervical region without myelopathy or radiculopathy M47.812    Chronic pain syndrome G89.4    Facet arthropathy, cervical M47.812    Knee osteoarthritis M17.10        Family History   Problem Relation Age of Onset    No Known Problems Mother     No Known Problems Father     Cancer Sister     Cancer Brother         Social History     Socioeconomic History    Marital status:      Spouse name: Not on file    Number of children: Not on file    Years of education: Not on file    Highest education level: Not on file   Occupational History    Occupation: retired   Tobacco Use    Smoking status: Former Smoker     Packs/day: 0.50     Years: 0.50     Pack years: 0.25     Quit date: 2020     Years since quittin.0    Smokeless tobacco: Never Used    Tobacco comment: quit smoking    Substance and Sexual Activity    Alcohol use: No    Drug use: No        Past Surgical History:   Procedure Laterality Date    HX APPENDECTOMY      HX BACK SURGERY  2016    L4/5 Lami Fusion    HX BREAST REDUCTION      HX CERVICAL FUSION      ACDF    HX CHOLECYSTECTOMY      HX HEENT      HX HERNIA REPAIR      hiatal hernia    HX HYSTERECTOMY      HX MOHS PROCEDURES Right     HX TONSIL AND ADENOIDECTOMY      WA PLASTIC SURGERY, NECK      ACDF by Dr. Al Cisse        Past Medical History:   Diagnosis Date    Anginal pain ([address]     Chronic obstructive pulmonary disease ([address]     GERD (gastroesophageal reflux disease)     Hypertension     Lung disease     Thyroid condition     enlarged    Tumors     fatty tissue        I have reviewed and agree with [address] [place] [address] Nw and ROS and intake form in chart and the record. Encounter Diagnoses     ICD-10-CM ICD-9-CM   1. Osteoarthritis of right knee, unspecified osteoarthritis type  M17.11 715.96   2. Right knee pain, unspecified chronicity  M25.561 719.46          HPI:  The patient is here status post right total knee replacement. Progressively getting worse, but much improved after 2021. Assessment/Plan:  Plan at this point will be for home health physical therapy. We will see her back in a virtual appointment in 3 weeks. Hopefully, she will be ready to go for outpatient at that point and go from there. Bel Biju, who was evaluated through a synchronous (real-time) video encounter, and/or her healthcare decision maker, is aware that it is a billable service, with coverage as determined by her insurance carrier. She provided verbal consent to proceed: Yes, and patient identification was verified.  It was conducted pursuant to the emergency declaration under the [facility], [facility] authority and the [employer] Act. A caregiver was present when appropriate. Ability to conduct physical exam was limited. I was in the office. The patient was at home. 5-10 min virtual visit. Return to Office: Follow-up and Dispositions    · Return in about 3 weeks (around 2/2/2021) for VIRTUAL VISIT. Scribed by Brianna Ann as dictated by [facility] COURTNEY Juarez MD.    Documentation True and Accepted Jarrod COURTNEY Juarez MD

## 2021-01-15 DIAGNOSIS — M17.11 OSTEOARTHRITIS OF RIGHT KNEE, UNSPECIFIED OSTEOARTHRITIS TYPE: Primary | ICD-10-CM

## 2021-01-18 ENCOUNTER — TELEPHONE (OUTPATIENT)
Dept: ORTHOPEDIC SURGERY | Age: 71
End: 2021-01-18

## 2021-01-20 ENCOUNTER — TELEPHONE (OUTPATIENT)
Dept: ORTHOPEDIC SURGERY | Age: 71
End: 2021-01-20

## 2021-01-20 DIAGNOSIS — M17.11 OSTEOARTHRITIS OF RIGHT KNEE, UNSPECIFIED OSTEOARTHRITIS TYPE: Primary | ICD-10-CM

## 2021-01-20 RX ORDER — HYDROMORPHONE HYDROCHLORIDE 2 MG/1
2 TABLET ORAL
Qty: 30 TAB | Refills: 0 | Status: SHIPPED | OUTPATIENT
Start: 2021-01-20 | End: 2021-02-03

## 2021-01-20 NOTE — TELEPHONE ENCOUNTER
RT tkr on 01/04/2021    She still has numbness and a burning sensation. Also hurts to touch. She would like to know how long should that last?    Requesting a refill for dilaudid sent to [facility].  Last refill  On 01/11/2021

## 2021-02-02 ENCOUNTER — VIRTUAL VISIT (OUTPATIENT)
Dept: ORTHOPEDIC SURGERY | Age: 71
End: 2021-02-02
Payer: MEDICARE

## 2021-02-02 DIAGNOSIS — M17.11 OSTEOARTHRITIS OF RIGHT KNEE, UNSPECIFIED OSTEOARTHRITIS TYPE: Primary | ICD-10-CM

## 2021-02-02 PROCEDURE — 99024 POSTOP FOLLOW-UP VISIT: CPT | Performed by: ORTHOPAEDIC SURGERY

## 2021-02-02 NOTE — PROGRESS NOTES
Name: Abraham Jha    : 1950     Service Dept: [facility]    Patient's Pharmacies:    [facility] [place] [address]  Phone: [phone] Fax: [phone]       Chief Complaint   Patient presents with    Knee Pain    Surgical Follow-up        There were no vitals taken for this visit. Allergies   Allergen Reactions    Cortisporin [Neomycin-Bacitracin-Poly-Hc] Other (comments)     tininitus    Morphine Other (comments)     Not allergic    Neomycin-Polymyxin-Hc Other (comments)    Oxycodone-Acetaminophen Itching    Oxycodone-Aspirin Other (comments)     Patient denies allergy        Current Outpatient Medications   Medication Sig Dispense Refill    HYDROmorphone (Dilaudid) 2 mg tablet Take 1 Tab by mouth every four to six (4-6) hours as needed for Pain for up to 14 days. Max Daily Amount: 12 mg. 30 Tab 0    potassium 99 mg tablet Take 99 mg by mouth daily.  cholecalciferol, vitamin D3, (Vitamin D3) 50 mcg (2,000 unit) tab Take 2,000 Units by mouth daily.  Ferrous Fumarate 325 mg (106 mg iron) tab Take 325 mg by mouth daily.  hydrocortisone (HYTONE) 2.5 % topical cream Apply  to affected area.  methIMAzole (TAPAZOLE) 5 mg tablet Take 5 mg by mouth daily.  HYDROcodone-acetaminophen (NORCO) 7.5-325 mg per tablet Take 1 Tab by mouth every eight (8) hours as needed for Pain. Max Daily Amount: 3 Tabs. 90 Tab 0    gabapentin (NEURONTIN) 300 mg capsule Take 1 Cap by mouth three (3) times daily. 90 Cap 3    naproxen (NAPROSYN) 500 mg tablet Take 1 Tab by mouth two (2) times daily (with meals). 60 Tab 3    atorvastatin (LIPITOR) 40 mg tablet Take 20 mg by mouth daily.  metoprolol tartrate (LOPRESSOR) 25 mg tablet Take 25 mg by mouth two (2) times a day.  meloxicam (MOBIC) 15 mg tablet Take 15 mg by mouth daily.       albuterol (PROVENTIL HFA, VENTOLIN HFA, PROAIR HFA) 90 mcg/actuation inhaler Take 2 Puffs by inhalation every four (4) hours as needed for Wheezing.  tiotropium (SPIRIVA) 18 mcg inhalation capsule Take 1 Cap by inhalation daily.  beclomethasone (QVAR) 80 mcg/actuation inhaler Take 1 Puff by inhalation two (2) times a day.  nitroglycerin (NITROSTAT) 0.4 mg SL tablet by SubLINGual route every five (5) minutes as needed for Chest Pain.  lisinopril-hydrochlorothiazide (PRINZIDE, ZESTORETIC) 10-12.5 mg per tablet Take  by mouth daily.  omeprazole (PRILOSEC) 20 mg capsule Take 40 mg by mouth daily.  ALPRAZolam (XANAX) 0.25 mg tablet Take 0.5 mg by mouth three (3) times daily (with meals).  traMADol (ULTRAM) 50 mg tablet Take 50 mg by mouth three (3) times daily.  aspirin delayed-release 81 mg tablet Take  by mouth daily.  CALCIUM CARBONATE/VITAMIN D3 (CALCIUM 600 WITH VITAMIN D3 PO) Take  by mouth three (3) times daily.  acetaminophen (TYLENOL) 500 mg tablet Take 1,000 mg by mouth three (3) times daily.           Patient Active Problem List   Diagnosis Code    Lumbago M54.5    Spondylisthesis M43.10    Spinal stenosis of lumbar region at multiple levels M48.061    Lumbar spinal stenosis M48.061    S/P lumbar fusion Z98.1    Cervicogenic headache R51.9    Occipital neuralgia of right side M54.81    Spondylosis of cervical region without myelopathy or radiculopathy M47.812    Chronic pain syndrome G89.4    Facet arthropathy, cervical M47.812    Knee osteoarthritis M17.10        Family History   Problem Relation Age of Onset    No Known Problems Mother     No Known Problems Father     Cancer Sister     Cancer Brother         Social History     Socioeconomic History    Marital status:      Spouse name: Not on file    Number of children: Not on file    Years of education: Not on file    Highest education level: Not on file   Occupational History    Occupation: retired   Tobacco Use    Smoking status: Former Smoker     Packs/day: 0.50     Years: 0.50     Pack years: 0.25     Quit date: 2020     Years since quittin.0    Smokeless tobacco: Never Used    Tobacco comment: quit smoking    Substance and Sexual Activity    Alcohol use: No    Drug use: No        Past Surgical History:   Procedure Laterality Date    HX APPENDECTOMY      HX BACK SURGERY  2016    L4/5 Lami Fusion    HX BREAST REDUCTION      HX CERVICAL FUSION      ACDF    HX CHOLECYSTECTOMY      HX HEENT      HX HERNIA REPAIR      hiatal hernia    HX HYSTERECTOMY      HX MOHS PROCEDURES Right     HX TONSIL AND ADENOIDECTOMY      UT PLASTIC SURGERY, NECK      ACDF by Dr. Nate Lewis        Past Medical History:   Diagnosis Date    Anginal pain ([address]     Chronic obstructive pulmonary disease ([address]     GERD (gastroesophageal reflux disease)     Hypertension     Lung disease     Thyroid condition     enlarged    Tumors     fatty tissue        I have reviewed and agree with [address] [place] [address] Nw and ROS and intake form in chart and the record. Encounter Diagnoses     ICD-10-CM ICD-9-CM   1. Osteoarthritis of right knee, unspecified osteoarthritis type  M17.11 715.96          PHONE APPOINTMENT    HPI:  The patient is here with a chief complaint of right knee pain, status post right total knee replacement on 2021. This is a phone appointment. She states she is progressing well, but not completely resolved in terms of using no assistive devices. Assessment/Plan:  I did talk to her about trying to go from home health. She needs to go ahead and start outpatient therapy. She is going to look into that. We will see her back on yearly appointment. She needs to wean off assistive devices in the meantime.         Toshia Conklin, who was evaluated through a synchronous (real-time) audio-video encounter, and/or her healthcare decision maker, is aware that it is a billable service, with coverage as determined by her insurance carrier. She provided verbal consent to proceed: Yes, and patient identification was verified. It was conducted pursuant to the emergency declaration under the [address] [place] [address] and the [employer] Act. A caregiver was present when appropriate. Ability to conduct physical exam was limited. I was in the office. The patient was at home. 5-10 MIN VIRTUAL VISIT      Return to Office: Follow-up and Dispositions    · Return in about 1 year (around 2/2/2022). Scribed by Ana Arzate as dictated by [facility] COURTNEY Roper MD.    Documentation True and Accepted Jarrod COURTNEY Roper MD

## 2021-02-02 NOTE — PATIENT INSTRUCTIONS
Knee Pain or Injury: Care Instructions Your Care Instructions Injuries are a common cause of knee problems. Sudden (acute) injuries may be caused by a direct blow to the knee. They can also be caused by abnormal twisting, bending, or falling on the knee. Pain, bruising, or swelling may be severe, and may start within minutes of the injury. Overuse is another cause of knee pain. Other causes are climbing stairs, kneeling, and other activities that use the knee. Everyday wear and tear, especially as you get older, also can cause knee pain. Rest, along with home treatment, often relieves pain and allows your knee to heal. If you have a serious knee injury, you may need tests and treatment. Follow-up care is a key part of your treatment and safety. Be sure to make and go to all appointments, and call your doctor if you are having problems. It's also a good idea to know your test results and keep a list of the medicines you take. How can you care for yourself at home? · Be safe with medicines. Read and follow all instructions on the label. ? If the doctor gave you a prescription medicine for pain, take it as prescribed. ? If you are not taking a prescription pain medicine, ask your doctor if you can take an over-the-counter medicine. · Rest and protect your knee. Take a break from any activity that may cause pain. · Put ice or a cold pack on your knee for 10 to 20 minutes at a time. Put a thin cloth between the ice and your skin. · Prop up a sore knee on a pillow when you ice it or anytime you sit or lie down for the next 3 days. Try to keep it above the level of your heart. This will help reduce swelling. · If your knee is not swollen, you can put moist heat, a heating pad, or a warm cloth on your knee. · If your doctor recommends an elastic bandage, sleeve, or other type of support for your knee, wear it as directed. · Follow your doctor's instructions about how much weight you can put on your leg. Use a cane, crutches, or a walker as instructed. · Follow your doctor's instructions about activity during your healing process. If you can do mild exercise, slowly increase your activity. · Reach and stay at a healthy weight. Extra weight can strain the joints, especially the knees and hips, and make the pain worse. Losing even a few pounds may help. When should you call for help? Call 911 anytime you think you may need emergency care. For example, call if: 
  · You have symptoms of a blood clot in your lung (called a pulmonary embolism). These may include: 
? Sudden chest pain. ? Trouble breathing. ? Coughing up blood. Call your doctor now or seek immediate medical care if: 
  · You have severe or increasing pain.  
  · Your leg or foot turns cold or changes color.  
  · You cannot stand or put weight on your knee.  
  · Your knee looks twisted or bent out of shape.  
  · You cannot move your knee.  
  · You have signs of infection, such as: 
? Increased pain, swelling, warmth, or redness. ? Red streaks leading from the knee. ? Pus draining from a place on your knee. ? A fever.  
  · You have signs of a blood clot in your leg (called a deep vein thrombosis), such as: 
? Pain in your calf, back of the knee, thigh, or groin. ? Redness and swelling in your leg or groin. Watch closely for changes in your health, and be sure to contact your doctor if: 
  · You have tingling, weakness, or numbness in your knee.  
  · You have any new symptoms, such as swelling.  
  · You have bruises from a knee injury that last longer than 2 weeks.  
  · You do not get better as expected. Where can you learn more? Go to http://www.gray.com/ Enter K195 in the search box to learn more about \"Knee Pain or Injury: Care Instructions. \" Current as of: June 26, 2019               Content Version: 12.6 © 4188-6985 [employer]. Care instructions adapted under license by [employer] (which disclaims liability or warranty for this information). If you have questions about a medical condition or this instruction, always ask your healthcare professional. [address] any warranty or liability for your use of this information.

## (undated) DEVICE — HYPODERMIC SAFETY NEEDLE: Brand: MAGELLAN

## (undated) DEVICE — T5 HOOD WITH PEEL AWAY FACE SHIELD

## (undated) DEVICE — ZIMMER® STERILE DISPOSABLE TOURNIQUET CUFF WITH PLC, DUAL PORT, SINGLE BLADDER, 34 IN. (86 CM)

## (undated) DEVICE — STRYKER PERFORMANCE SERIES SAGITTAL BLADE: Brand: STRYKER PERFORMANCE SERIES

## (undated) DEVICE — CUFF BLD PRESSURE MONITORING LNG AD 23-33 CM 1 TUBE MY CUF

## (undated) DEVICE — (D)BNDG ADHESIVE FABRIC 3/4X3 -- DISC BY MFR USE ITEM 357960

## (undated) DEVICE — STERILE POLYISOPRENE POWDER-FREE SURGICAL GLOVES WITH EMOLLIENT COATING: Brand: PROTEXIS

## (undated) DEVICE — NEEDLE SPNL 22GA L3.5IN BLK HUB S STL REG WALL FIT STYL W/

## (undated) DEVICE — (D)HANDPIECE IRR W/HI FLO TIP -- DUPLICATE USE ITEM 121586

## (undated) DEVICE — DRAPE,TOP,102X53,STERILE: Brand: MEDLINE

## (undated) DEVICE — GOWN,PREVENTION PLUS,XL,ST,24/CS: Brand: MEDLINE

## (undated) DEVICE — INTENDED FOR TISSUE SEPARATION, AND OTHER PROCEDURES THAT REQUIRE A SHARP SURGICAL BLADE TO PUNCTURE OR CUT.: Brand: BARD-PARKER SAFETY BLADES SIZE 10, STERILE

## (undated) DEVICE — SYR 10ML CTRL LR LCK NSAF LF --

## (undated) DEVICE — SUTURE VCRL SZ 1 L27IN ABSRB UD CT-1 L36MM 1/2 CIR J261H

## (undated) DEVICE — 3M™ IOBAN™ 2 ANTIMICROBIAL INCISE DRAPE 6650EZ: Brand: IOBAN™ 2

## (undated) DEVICE — DRESSING SURG 4X14 IN POSTOP SIL BORD MEPILEX

## (undated) DEVICE — UNDERGLOVE SURG SZ 7.5 BLU LTX FREE SYN POLYISOPRENE

## (undated) DEVICE — BNDG,ELSTC,MATRIX,STRL,6"X5YD,LF,HOOK&LP: Brand: MEDLINE

## (undated) DEVICE — DRAPE TWL SURG 16X26IN BLU ORB04] ALLCARE INC]

## (undated) DEVICE — TRAY SUPP STD NO DRUG W EXTENSION SET

## (undated) DEVICE — (D)PREP SKN CHLRAPRP APPL 26ML -- CONVERT TO ITEM 371833

## (undated) DEVICE — SUTURE VCRL SZ 0 L27IN ABSRB UD L36MM CT-1 1/2 CIR J260H

## (undated) DEVICE — PENCIL SMK EVAC L10FT TBNG NONSTICK ESU BLDE PLUMEPEN ELITE

## (undated) DEVICE — MIRAGE SWIFT II PILLOW LGE: Brand: MIRAGE SWIFT II

## (undated) DEVICE — RECIPROCATING BLADE HEAVY DUTY LONG, OFFSET  (77.6 X 0.77 X 11.2MM)

## (undated) DEVICE — SHEET,DRAPE,70X100,STERILE: Brand: MEDLINE

## (undated) DEVICE — BLADE ELECTRODE: Brand: VALLEYLAB

## (undated) DEVICE — TOTAL KNEE PACK: Brand: MEDLINE INDUSTRIES, INC.

## (undated) DEVICE — GLOVE SURG 7 BIOGEL PI ULTRATOUCH G

## (undated) DEVICE — SUTURE VCRL SZ 2-0 L27IN ABSRB UD L26MM CT-2 1/2 CIR J269H

## (undated) DEVICE — SPONGE LAP SOFT 18X18 IN X RAY DETECTABLE

## (undated) DEVICE — SKIN CLOS DERMABND PRINEO 60CM -- DERMABOUND PRINEO

## (undated) DEVICE — SOLUTION IV 250ML 0.9% SOD CHL CLR INJ FLX BG CONT PRT CLSR

## (undated) DEVICE — MANIFOLD CART ULT HI FLOW W 3 PRT FOR SUCT

## (undated) DEVICE — ATTUNE SOLO PINNING SYSTEM

## (undated) DEVICE — GOWN,AURORA,FABRIC-REINFORCED,X-LARGE: Brand: MEDLINE

## (undated) DEVICE — HOOD, PEEL-AWAY: Brand: FLYTE

## (undated) DEVICE — STERILE POLYISOPRENE POWDER-FREE SURGICAL GLOVES: Brand: PROTEXIS

## (undated) DEVICE — BOWL BNE CEM MIX SPAT CURET SMARTMIX CTS

## (undated) DEVICE — SOL IRR NACL 0.9% 500ML POUR --

## (undated) DEVICE — SUTURE MCRYL SZ 3-0 L27IN ABSRB UD L24MM PS-1 3/8 CIR PRIM Y936H

## (undated) DEVICE — AVANOS* SHORT BEVEL NEEDLE: Brand: AVANOS